# Patient Record
Sex: MALE | Race: WHITE | NOT HISPANIC OR LATINO | URBAN - METROPOLITAN AREA
[De-identification: names, ages, dates, MRNs, and addresses within clinical notes are randomized per-mention and may not be internally consistent; named-entity substitution may affect disease eponyms.]

---

## 2019-03-29 ENCOUNTER — EMERGENCY (EMERGENCY)
Facility: HOSPITAL | Age: 54
LOS: 1 days | Discharge: HOME | End: 2019-03-29
Attending: EMERGENCY MEDICINE | Admitting: EMERGENCY MEDICINE
Payer: COMMERCIAL

## 2019-03-29 VITALS
DIASTOLIC BLOOD PRESSURE: 65 MMHG | HEART RATE: 58 BPM | SYSTOLIC BLOOD PRESSURE: 92 MMHG | RESPIRATION RATE: 15 BRPM | OXYGEN SATURATION: 98 %

## 2019-03-29 VITALS
DIASTOLIC BLOOD PRESSURE: 65 MMHG | HEART RATE: 93 BPM | SYSTOLIC BLOOD PRESSURE: 105 MMHG | TEMPERATURE: 98 F | WEIGHT: 205.03 LBS | RESPIRATION RATE: 18 BRPM | OXYGEN SATURATION: 96 %

## 2019-03-29 PROBLEM — Z00.00 ENCOUNTER FOR PREVENTIVE HEALTH EXAMINATION: Status: ACTIVE | Noted: 2019-03-29

## 2019-03-29 LAB
ALBUMIN SERPL ELPH-MCNC: 4.3 G/DL — SIGNIFICANT CHANGE UP (ref 3.3–5)
ALP SERPL-CCNC: 72 U/L — SIGNIFICANT CHANGE UP (ref 40–120)
ALT FLD-CCNC: 32 U/L — SIGNIFICANT CHANGE UP (ref 12–78)
ANION GAP SERPL CALC-SCNC: 12 MMOL/L — SIGNIFICANT CHANGE UP (ref 5–17)
APTT BLD: 32.8 SEC — SIGNIFICANT CHANGE UP (ref 28.5–37)
AST SERPL-CCNC: 27 U/L — SIGNIFICANT CHANGE UP (ref 15–37)
BILIRUB SERPL-MCNC: 1.5 MG/DL — HIGH (ref 0.2–1.2)
BUN SERPL-MCNC: 23 MG/DL — SIGNIFICANT CHANGE UP (ref 7–23)
CALCIUM SERPL-MCNC: 9.1 MG/DL — SIGNIFICANT CHANGE UP (ref 8.5–10.1)
CHLORIDE SERPL-SCNC: 106 MMOL/L — SIGNIFICANT CHANGE UP (ref 96–108)
CO2 SERPL-SCNC: 24 MMOL/L — SIGNIFICANT CHANGE UP (ref 22–31)
CREAT SERPL-MCNC: 1.3 MG/DL — SIGNIFICANT CHANGE UP (ref 0.5–1.3)
GLUCOSE SERPL-MCNC: 123 MG/DL — HIGH (ref 70–99)
HCT VFR BLD CALC: 50.1 % — HIGH (ref 39–50)
HGB BLD-MCNC: 17.8 G/DL — HIGH (ref 13–17)
INR BLD: 0.97 RATIO — SIGNIFICANT CHANGE UP (ref 0.88–1.16)
MAGNESIUM SERPL-MCNC: 2.1 MG/DL — SIGNIFICANT CHANGE UP (ref 1.6–2.6)
MCHC RBC-ENTMCNC: 31.8 PG — SIGNIFICANT CHANGE UP (ref 27–34)
MCHC RBC-ENTMCNC: 35.5 GM/DL — SIGNIFICANT CHANGE UP (ref 32–36)
MCV RBC AUTO: 89.5 FL — SIGNIFICANT CHANGE UP (ref 80–100)
NRBC # BLD: 0 /100 WBCS — SIGNIFICANT CHANGE UP (ref 0–0)
PLATELET # BLD AUTO: 255 K/UL — SIGNIFICANT CHANGE UP (ref 150–400)
POTASSIUM SERPL-MCNC: 3.4 MMOL/L — LOW (ref 3.5–5.3)
POTASSIUM SERPL-SCNC: 3.4 MMOL/L — LOW (ref 3.5–5.3)
PROT SERPL-MCNC: 7.5 G/DL — SIGNIFICANT CHANGE UP (ref 6–8.3)
PROTHROM AB SERPL-ACNC: 11 SEC — SIGNIFICANT CHANGE UP (ref 10–12.9)
RBC # BLD: 5.6 M/UL — SIGNIFICANT CHANGE UP (ref 4.2–5.8)
RBC # FLD: 12.4 % — SIGNIFICANT CHANGE UP (ref 10.3–14.5)
SODIUM SERPL-SCNC: 142 MMOL/L — SIGNIFICANT CHANGE UP (ref 135–145)
TROPONIN I SERPL-MCNC: <.015 NG/ML — SIGNIFICANT CHANGE UP (ref 0.01–0.04)
WBC # BLD: 7.36 K/UL — SIGNIFICANT CHANGE UP (ref 3.8–10.5)
WBC # FLD AUTO: 7.36 K/UL — SIGNIFICANT CHANGE UP (ref 3.8–10.5)

## 2019-03-29 PROCEDURE — 92960 CARDIOVERSION ELECTRIC EXT: CPT

## 2019-03-29 PROCEDURE — 96374 THER/PROPH/DIAG INJ IV PUSH: CPT | Mod: XU

## 2019-03-29 PROCEDURE — 71045 X-RAY EXAM CHEST 1 VIEW: CPT

## 2019-03-29 PROCEDURE — 84484 ASSAY OF TROPONIN QUANT: CPT

## 2019-03-29 PROCEDURE — 85610 PROTHROMBIN TIME: CPT

## 2019-03-29 PROCEDURE — 71045 X-RAY EXAM CHEST 1 VIEW: CPT | Mod: 26

## 2019-03-29 PROCEDURE — 99285 EMERGENCY DEPT VISIT HI MDM: CPT | Mod: 25

## 2019-03-29 PROCEDURE — 36415 COLL VENOUS BLD VENIPUNCTURE: CPT

## 2019-03-29 PROCEDURE — 83735 ASSAY OF MAGNESIUM: CPT

## 2019-03-29 PROCEDURE — 96375 TX/PRO/DX INJ NEW DRUG ADDON: CPT

## 2019-03-29 PROCEDURE — 80053 COMPREHEN METABOLIC PANEL: CPT

## 2019-03-29 PROCEDURE — 85027 COMPLETE CBC AUTOMATED: CPT

## 2019-03-29 PROCEDURE — 96376 TX/PRO/DX INJ SAME DRUG ADON: CPT

## 2019-03-29 PROCEDURE — 93010 ELECTROCARDIOGRAM REPORT: CPT | Mod: 76

## 2019-03-29 PROCEDURE — 96372 THER/PROPH/DIAG INJ SC/IM: CPT | Mod: XU

## 2019-03-29 PROCEDURE — 93005 ELECTROCARDIOGRAM TRACING: CPT

## 2019-03-29 PROCEDURE — 85730 THROMBOPLASTIN TIME PARTIAL: CPT

## 2019-03-29 PROCEDURE — 99291 CRITICAL CARE FIRST HOUR: CPT

## 2019-03-29 RX ORDER — METOPROLOL TARTRATE 50 MG
25 TABLET ORAL DAILY
Qty: 0 | Refills: 0 | Status: DISCONTINUED | OUTPATIENT
Start: 2019-03-29 | End: 2019-04-02

## 2019-03-29 RX ORDER — ENOXAPARIN SODIUM 100 MG/ML
90 INJECTION SUBCUTANEOUS ONCE
Qty: 0 | Refills: 0 | Status: COMPLETED | OUTPATIENT
Start: 2019-03-29 | End: 2019-03-29

## 2019-03-29 RX ORDER — SODIUM CHLORIDE 9 MG/ML
1000 INJECTION INTRAMUSCULAR; INTRAVENOUS; SUBCUTANEOUS
Qty: 0 | Refills: 0 | Status: DISCONTINUED | OUTPATIENT
Start: 2019-03-29 | End: 2019-04-02

## 2019-03-29 RX ORDER — METOPROLOL TARTRATE 50 MG
1 TABLET ORAL
Qty: 30 | Refills: 0 | OUTPATIENT
Start: 2019-03-29 | End: 2019-04-27

## 2019-03-29 RX ORDER — APIXABAN 2.5 MG/1
1 TABLET, FILM COATED ORAL
Qty: 60 | Refills: 0
Start: 2019-03-29 | End: 2019-04-27

## 2019-03-29 RX ORDER — MORPHINE SULFATE 50 MG/1
4 CAPSULE, EXTENDED RELEASE ORAL ONCE
Qty: 0 | Refills: 0 | Status: DISCONTINUED | OUTPATIENT
Start: 2019-03-29 | End: 2019-03-29

## 2019-03-29 RX ORDER — POTASSIUM CHLORIDE 20 MEQ
40 PACKET (EA) ORAL EVERY 4 HOURS
Qty: 0 | Refills: 0 | Status: COMPLETED | OUTPATIENT
Start: 2019-03-29 | End: 2019-03-29

## 2019-03-29 RX ORDER — IBUTILIDE FUMARATE 0.1 MG/ML
1 INJECTION, SOLUTION INTRAVENOUS ONCE
Qty: 0 | Refills: 0 | Status: COMPLETED | OUTPATIENT
Start: 2019-03-29 | End: 2019-03-29

## 2019-03-29 RX ORDER — MORPHINE SULFATE 50 MG/1
2 CAPSULE, EXTENDED RELEASE ORAL ONCE
Qty: 0 | Refills: 0 | Status: DISCONTINUED | OUTPATIENT
Start: 2019-03-29 | End: 2019-03-29

## 2019-03-29 RX ORDER — ONDANSETRON 8 MG/1
4 TABLET, FILM COATED ORAL ONCE
Qty: 0 | Refills: 0 | Status: COMPLETED | OUTPATIENT
Start: 2019-03-29 | End: 2019-03-29

## 2019-03-29 RX ORDER — METOPROLOL TARTRATE 50 MG
1 TABLET ORAL
Qty: 30 | Refills: 0
Start: 2019-03-29 | End: 2019-04-27

## 2019-03-29 RX ADMIN — IBUTILIDE FUMARATE 360 MILLIGRAM(S): 0.1 INJECTION, SOLUTION INTRAVENOUS at 08:58

## 2019-03-29 RX ADMIN — SODIUM CHLORIDE 125 MILLILITER(S): 9 INJECTION INTRAMUSCULAR; INTRAVENOUS; SUBCUTANEOUS at 08:40

## 2019-03-29 RX ADMIN — Medication 40 MILLIEQUIVALENT(S): at 08:39

## 2019-03-29 RX ADMIN — IBUTILIDE FUMARATE 360 MILLIGRAM(S): 0.1 INJECTION, SOLUTION INTRAVENOUS at 08:06

## 2019-03-29 RX ADMIN — MORPHINE SULFATE 2 MILLIGRAM(S): 50 CAPSULE, EXTENDED RELEASE ORAL at 11:50

## 2019-03-29 RX ADMIN — Medication 40 MILLIEQUIVALENT(S): at 09:30

## 2019-03-29 RX ADMIN — ENOXAPARIN SODIUM 90 MILLIGRAM(S): 100 INJECTION SUBCUTANEOUS at 11:01

## 2019-03-29 RX ADMIN — ONDANSETRON 4 MILLIGRAM(S): 8 TABLET, FILM COATED ORAL at 11:01

## 2019-03-29 NOTE — CONSULT NOTE ADULT - ASSESSMENT
yo F/M with PMH of    Doubt ACS, pain likely pleuritic vs musculoskeletal.    - Reccomened Ibutilide 1mg over 10 min x2 if needed  - Last ate/ drank 5:30am. May need   - His CKs are flat, which suggests against acute atherosclerotic plaque rupture  - No evidence of volume overload         - Monitor and replete lytes, keep K>4, Mg>2    - Other cardiovascular workup will depend on clinical course.  - All other workup per primary team  - Will follow yo F/M with PMH of    Doubt ACS, pain likely pleuritic vs musculoskeletal.    - Reccomened Ibutilide 1mg over 10 min x2   - Last ate/ drank 6:00am. May need cardioversion  - Potassium supplemented 40meq x2  - Need outpatient w/u with stress test and echo  - Likely discharge on NOAC  - Will follow up with Dr. Delgado/ Zay on discharge  - Monitor and replete lytes, keep K>4, Mg>2  - Will follow 53 year old man with PAF, now AF with RVR    Doubt ACS, pain likely pleuritic vs musculoskeletal.    - Reccomened Ibutilide 1mg over 10 min x2   - Last ate/ drank 6:00am. May need cardioversion.  WIll arrange with anesthesia if necessary  - Potassium supplemented 40meq x2  - Need outpatient w/u with stress test and echo  - Likely discharge on NOAC  - Will follow up with Dr. Delgado/ Zay on discharge  - Monitor and replete lytes, keep K>4, Mg>2  - Will follow

## 2019-03-29 NOTE — ED PROVIDER NOTE - OBJECTIVE STATEMENT
"flutter" last night, slept and woke up c flutter , pt drank a cup of coffee c mild around 0530 today.   Last episode 2009, hospitalized in [facility] and was cardioverted.   subjective sob and almost passed out this am.

## 2019-03-29 NOTE — PHARMACOTHERAPY INTERVENTION NOTE - COMMENTS
Patient in a. fib (RVR) ordered ibutilide in ER - discussed with ER MD and cardio, EKG shows QTc 490, K 3.4 prior to infusion. Patient received 1 dose of ibutilide, recommended to Dr Lewis for repeat EKG post infusion. Dr Trevizo cardio approve for second dose of ibutilide (repeat EKG and QTc reviewed); per cardio pt may convert back to NSR so would like to proceed with another dose - ok

## 2019-03-29 NOTE — ED PROVIDER NOTE - CARE PROVIDER_API CALL
Han Trevizo)  Internal Medicine  [address] [place] [address]  Phone: [phone]  Fax: [phone]  Follow Up Time:

## 2019-03-29 NOTE — ED ADULT NURSE NOTE - OBJECTIVE STATEMENT
patient comes to ED for evaluation of heart palpitations pt has hx of a fib 10 years ago on no medication patient states he was sitting at his desk and began feeling short of breath and having palpitations denies chest pain

## 2019-03-29 NOTE — CONSULT NOTE ADULT - SUBJECTIVE AND OBJECTIVE BOX
Patient is a 53y old  Male who presents with a chief complaint of chest flutter    HPI: 54 yo M with PMH of Afib p/w chest flutter      PAST MEDICAL & SURGICAL HISTORY:  Afib  No significant past surgical history      Home Medications:  atorvastatin:  (29 Mar 2019 07:03)    MEDICATIONS  (STANDING):  ibutilide IVPB 1 milliGRAM(s) IV Intermittent Once  sodium chloride 0.9%. 1000 milliLiter(s) (125 mL/Hr) IV Continuous <Continuous>    FAMILY HISTORY: No family history of MI or CAD          REVIEW OF SYSTEMS    Constitutional: denies fever, chills, diaphoresis   HEENT: denies blurry vision, difficulty hearing  Respiratory: denies SOB, BAHENA, cough, sputum production, wheezing  Cardiovascular: Admits chest flutter, no LE edema  Gastrointestinal: denies nausea, vomiting, diarrhea, constipation, abdominal pain, melena, hematochezia   Genitourinary: denies dysuria, frequency, urgency, hematuria   Skin: Denies rashes, itching  Musculoskeletal: denies myalgias, joint swelling, muscle weakness  Neurologic: denies headache, weakness, dizziness, numbness/tingling  Hematology/Oncology: denies bleeding, easy bruising  ROS negative except as noted above    Allergic/Immunologic: Allergies    PC Pen VK (Unknown)        SOCIAL HISTORY: No tobacco, alcohol or drug use    Vital Signs Last 24 Hrs  T(C): 36.5 (29 Mar 2019 07:03), Max: 36.5 (29 Mar 2019 07:03)  T(F): 97.7 (29 Mar 2019 07:03), Max: 97.7 (29 Mar 2019 07:03)  HR: 128 (29 Mar 2019 07:41) (93 - 128)  BP: 126/68 (29 Mar 2019 07:41) (105/65 - 126/68)  BP(mean): --  RR: 16 (29 Mar 2019 07:41) (16 - 18)  SpO2: 100% (29 Mar 2019 07:41) (96% - 100%)    PHYSICAL EXAM:    Physical Exam:  General: Well developed, well nourished, NAD  HEENT: NCAT, PERRLA, EOMI bl, moist mucous membranes   Neck: Supple, nontender, no mass  Neurology: A&Ox3, nonfocal, sensation intact   Respiratory: CTA B/L, No W/R/R  CV: Tachycardic, irregular, no murmurs, rubs or gallops  Abdominal: Soft, NT, ND +BSx4, no palpable masses  Extremities: No C/C/E, + peripheral pulses  MSK: Normal ROM, no joint erythema or warmth, no joint swelling   Heme: No palpable supraclavicular nodules, no obvious ecchymosis or petechiae   Skin: warm, dry, normal color          ECG:    I&O's Detail      LABS:                        17.8   7.36  )-----------( 255      ( 29 Mar 2019 07:33 )             50.1     03-29    142  |  106  |  23  ----------------------------<  123<H>  3.4<L>   |  24  |  1.30    Ca    9.1      29 Mar 2019 07:33    TPro  7.5  /  Alb  4.3  /  TBili  1.5<H>  /  DBili  x   /  AST  27  /  ALT  32  /  AlkPhos  72  03-29    CARDIAC MARKERS ( 29 Mar 2019 07:33 )  <.015 ng/mL / x     / x     / x     / x          PT/INR - ( 29 Mar 2019 07:37 )   PT: 11.0 sec;   INR: 0.97 ratio         PTT - ( 29 Mar 2019 07:37 )  PTT:32.8 sec Patient is a 53y old  Male who presents with a chief complaint of chest flutter    HPI: 54 yo M with PMH of Afib p/w chest flutter since last night. No precipitating events or trauma. Went to sleep without any issues and woke up with same symptoms, pt drank a cup of coffee which worsened the symptoms at 5:30am. After drinking coffee, felt feint, shaky and short of breath. Has episode of same, last episode , hospitalized in [facility] and was cardioverted. Follows cardiology Dr. Delgado and Dr. Jovel who he last saw in . Had an echo and stress test which was negative back in . Denies dyspnea, PND, orthopnea, near syncope, syncope, or lower extremity edema.       PAST MEDICAL & SURGICAL HISTORY:  Afib  No significant past surgical history      Home Medications:  atorvastatin:  (29 Mar 2019 07:03)    MEDICATIONS  (STANDING):  ibutilide IVPB 1 milliGRAM(s) IV Intermittent Once  sodium chloride 0.9%. 1000 milliLiter(s) (125 mL/Hr) IV Continuous <Continuous>    FAMILY HISTORY: Father rheumatic fever,  age 43. Mother age 89 valve replacement           REVIEW OF SYSTEMS    Constitutional: denies fever, chills, diaphoresis   HEENT: denies blurry vision, difficulty hearing  Respiratory: denies BAHENA, cough, sputum production, wheezing  Cardiovascular: Admits chest flutter, no LE edema  Gastrointestinal: denies nausea, vomiting, diarrhea, constipation, abdominal pain, melena, hematochezia   Genitourinary: denies dysuria, frequency, urgency, hematuria   Skin: Denies rashes, itching  Musculoskeletal: denies myalgias, joint swelling, muscle weakness  Neurologic: denies headache, weakness, dizziness, numbness/tingling  Hematology/Oncology: denies bleeding, easy bruising  ROS negative except as noted above    Allergic/Immunologic: Allergies    PC Pen VK (Unknown)  Doesnt tolerate ASA or Advil        SOCIAL HISTORY: No tobacco, alcohol or drug use    Vital Signs Last 24 Hrs  T(C): 36.5 (29 Mar 2019 07:03), Max: 36.5 (29 Mar 2019 07:03)  T(F): 97.7 (29 Mar 2019 07:03), Max: 97.7 (29 Mar 2019 07:03)  HR: 128 (29 Mar 2019 07:41) (93 - 128)  BP: 126/68 (29 Mar 2019 07:41) (105/65 - 126/68)  BP(mean): --  RR: 16 (29 Mar 2019 07:41) (16 - 18)  SpO2: 100% (29 Mar 2019 07:41) (96% - 100%)    PHYSICAL EXAM:    Physical Exam:  General: Well developed, well nourished, NAD  HEENT: NCAT, PERRLA, EOMI bl, moist mucous membranes   Neck: Supple, nontender, no mass  Neurology: A&Ox3, nonfocal, sensation intact   Respiratory: CTA B/L, No W/R/R  CV: Tachycardic, irregular, no murmurs, rubs or gallops  Abdominal: Soft, NT, ND +BSx4, no palpable masses  Extremities: No C/C/E, + peripheral pulses  MSK: Normal ROM, no joint erythema or warmth, no joint swelling   Heme: No palpable supraclavicular nodules, no obvious ecchymosis or petechiae   Skin: warm, dry, normal color          ECG: Afib with RVR rate 154bpm    I&O's Detail      LABS:                        17.8   7.36  )-----------( 255      ( 29 Mar 2019 07:33 )             50.1     03-    142  |  106  |  23  ----------------------------<  123<H>  3.4<L>   |  24  |  1.30    Ca    9.1      29 Mar 2019 07:33    TPro  7.5  /  Alb  4.3  /  TBili  1.5<H>  /  DBili  x   /  AST  27  /  ALT  32  /  AlkPhos  72  03-29    CARDIAC MARKERS ( 29 Mar 2019 07:33 )  <.015 ng/mL / x     / x     / x     / x          PT/INR - ( 29 Mar 2019 07:37 )   PT: 11.0 sec;   INR: 0.97 ratio         PTT - ( 29 Mar 2019 07:37 )  PTT:32.8 sec

## 2019-03-29 NOTE — ED PROVIDER NOTE - PROGRESS NOTE DETAILS
All results were explained to patient and/or family and a copy of all available results given.  pt cleared by Santhosh orozco cardiocherelle. Repeat ekg 60 bpm

## 2019-03-29 NOTE — ED ADULT TRIAGE NOTE - CHIEF COMPLAINT QUOTE
Pt reports that he feels like his heart is racing, and is short of breath. Denies pain, denies any other symptoms.

## 2019-04-02 ENCOUNTER — NON-APPOINTMENT (OUTPATIENT)
Age: 54
End: 2019-04-02

## 2019-04-02 ENCOUNTER — APPOINTMENT (OUTPATIENT)
Dept: CARDIOLOGY | Facility: CLINIC | Age: 54
End: 2019-04-02
Payer: COMMERCIAL

## 2019-04-02 VITALS
HEIGHT: 77 IN | OXYGEN SATURATION: 100 % | DIASTOLIC BLOOD PRESSURE: 69 MMHG | SYSTOLIC BLOOD PRESSURE: 129 MMHG | WEIGHT: 204 LBS | HEART RATE: 54 BPM | BODY MASS INDEX: 24.09 KG/M2

## 2019-04-02 DIAGNOSIS — Z82.49 FAMILY HISTORY OF ISCHEMIC HEART DISEASE AND OTHER DISEASES OF THE CIRCULATORY SYSTEM: ICD-10-CM

## 2019-04-02 DIAGNOSIS — Z86.012 PERSONAL HISTORY OF BENIGN CARCINOID TUMOR: ICD-10-CM

## 2019-04-02 PROBLEM — I48.91 UNSPECIFIED ATRIAL FIBRILLATION: Chronic | Status: ACTIVE | Noted: 2019-03-29

## 2019-04-02 PROCEDURE — 99215 OFFICE O/P EST HI 40 MIN: CPT

## 2019-04-02 PROCEDURE — 93000 ELECTROCARDIOGRAM COMPLETE: CPT

## 2019-04-02 NOTE — DISCUSSION/SUMMARY
[FreeTextEntry1] : This is a 53 year old man with a history of PAF who presents to the office for follow up.  This past weekend he went into atrial fibrillation.  He presented to [facility], and got ibutilide x 2, which did not convert him.  He ended up undergoing DCCV.  He was started on Eliquis and metoprolol.  His last episode was 10 years ago.  His CHADSVASC score is 0.  He will continue Eliquis and metoprolol for one month, and then stop.  He is going to undergo echocardiography to assess for any structural heart disease.  WE discussed that he should be on aspirin, but he can not take it secondary to past GI bleeding issues.  He is going to stay off of all antiplats moving forward.  His thromboembolic risk is low.  WE discussed the natural history of AF, and he will call me when if it recurs.  He will follow yearly.

## 2019-04-02 NOTE — HISTORY OF PRESENT ILLNESS
[FreeTextEntry1] : This is a 53 year old man with a history of PAF who presents to the office for follow up.  This past weekend he went into atrial fibrillation.  He presented to [facility], and got ibutilide x 2, which did not convert him.  He ended up undergoing DCCV.  He was started on Eliquis and metoprolol.  His last episode was 10 years ago.  he has not had any recurrences since discharge.  No bleeding.  He denies chest pain, dyspnea, PND, orthopnea, LE swelling, dizziness, or syncope.  He does have a bit of increased fatigue.

## 2019-04-02 NOTE — PHYSICAL EXAM
[Normal Appearance] : normal appearance [General Appearance - In No Acute Distress] : no acute distress [Normal Conjunctiva] : the conjunctiva exhibited no abnormalities [Normal Oral Mucosa] : normal oral mucosa [Normal Jugular Venous V Waves Present] : normal jugular venous V waves present [FreeTextEntry1] : No JVD [Respiration, Rhythm And Depth] : normal respiratory rhythm and effort [Exaggerated Use Of Accessory Muscles For Inspiration] : no accessory muscle use [Auscultation Breath Sounds / Voice Sounds] : lungs were clear to auscultation bilaterally [Not Palpable] : not palpable [No Precordial Heave] : no precordial heave was noted [Bradycardia] : bradycardic [Rhythm Regular] : regular [Normal S1] : normal S1 [Normal S2] : normal S2 [No Gallop] : no gallop heard [No Murmur] : no murmurs heard [Right Carotid Bruit] : no bruit heard over the right carotid [Left Carotid Bruit] : no bruit heard over the left carotid [1+] : left 1+ [No Pitting Edema] : no pitting edema present [Bowel Sounds] : normal bowel sounds [Abdomen Soft] : soft [Abdomen Tenderness] : non-tender [Abnormal Walk] : normal gait [Gait - Sufficient For Exercise Testing] : the gait was sufficient for exercise testing [Nail Clubbing] : no clubbing of the fingernails [Cyanosis, Localized] : no localized cyanosis [Petechial Hemorrhages (___cm)] : no petechial hemorrhages [Skin Color & Pigmentation] : normal skin color and pigmentation [] : no rash [No Venous Stasis] : no venous stasis [Skin Lesions] : no skin lesions [Oriented To Time, Place, And Person] : oriented to person, place, and time [Affect] : the affect was normal [Mood] : the mood was normal [No Anxiety] : not feeling anxious

## 2019-04-02 NOTE — REASON FOR VISIT
[Follow-Up - From Hospitalization] : follow-up of a recent hospitalization for [Atrial Fibrillation] : atrial fibrillation [Spouse] : spouse

## 2019-04-17 ENCOUNTER — APPOINTMENT (OUTPATIENT)
Dept: CARDIOLOGY | Facility: CLINIC | Age: 54
End: 2019-04-17
Payer: COMMERCIAL

## 2019-04-17 PROCEDURE — 93306 TTE W/DOPPLER COMPLETE: CPT

## 2020-03-16 ENCOUNTER — NON-APPOINTMENT (OUTPATIENT)
Age: 55
End: 2020-03-16

## 2020-03-16 ENCOUNTER — APPOINTMENT (OUTPATIENT)
Dept: CARDIOLOGY | Facility: CLINIC | Age: 55
End: 2020-03-16
Payer: COMMERCIAL

## 2020-03-16 VITALS
HEART RATE: 47 BPM | OXYGEN SATURATION: 100 % | HEIGHT: 77 IN | WEIGHT: 202 LBS | BODY MASS INDEX: 23.85 KG/M2 | DIASTOLIC BLOOD PRESSURE: 71 MMHG | SYSTOLIC BLOOD PRESSURE: 110 MMHG

## 2020-03-16 PROCEDURE — 93000 ELECTROCARDIOGRAM COMPLETE: CPT

## 2020-03-16 PROCEDURE — 99214 OFFICE O/P EST MOD 30 MIN: CPT

## 2020-03-16 RX ORDER — METOPROLOL SUCCINATE 25 MG/1
25 TABLET, EXTENDED RELEASE ORAL
Qty: 30 | Refills: 0 | Status: DISCONTINUED | COMMUNITY
Start: 2019-03-29 | End: 2020-03-16

## 2020-03-16 RX ORDER — APIXABAN 5 MG/1
5 TABLET, FILM COATED ORAL
Qty: 60 | Refills: 0 | Status: DISCONTINUED | COMMUNITY
Start: 2019-03-29 | End: 2020-03-16

## 2020-05-15 ENCOUNTER — INPATIENT (INPATIENT)
Facility: HOSPITAL | Age: 55
LOS: 0 days | Discharge: HOME | DRG: 389 | End: 2020-05-16
Attending: FAMILY MEDICINE | Admitting: INTERNAL MEDICINE
Payer: COMMERCIAL

## 2020-05-15 VITALS
HEIGHT: 77 IN | OXYGEN SATURATION: 100 % | HEART RATE: 54 BPM | TEMPERATURE: 98 F | DIASTOLIC BLOOD PRESSURE: 74 MMHG | WEIGHT: 205.03 LBS | SYSTOLIC BLOOD PRESSURE: 132 MMHG | RESPIRATION RATE: 16 BRPM

## 2020-05-15 DIAGNOSIS — Z29.9 ENCOUNTER FOR PROPHYLACTIC MEASURES, UNSPECIFIED: ICD-10-CM

## 2020-05-15 DIAGNOSIS — K76.9 LIVER DISEASE, UNSPECIFIED: ICD-10-CM

## 2020-05-15 DIAGNOSIS — M79.89 OTHER SPECIFIED SOFT TISSUE DISORDERS: ICD-10-CM

## 2020-05-15 DIAGNOSIS — E78.5 HYPERLIPIDEMIA, UNSPECIFIED: ICD-10-CM

## 2020-05-15 DIAGNOSIS — D3A.00 BENIGN CARCINOID TUMOR OF UNSPECIFIED SITE: ICD-10-CM

## 2020-05-15 DIAGNOSIS — K56.1 INTUSSUSCEPTION: ICD-10-CM

## 2020-05-15 DIAGNOSIS — D3A.00 BENIGN CARCINOID TUMOR OF UNSPECIFIED SITE: Chronic | ICD-10-CM

## 2020-05-15 DIAGNOSIS — R00.1 BRADYCARDIA, UNSPECIFIED: ICD-10-CM

## 2020-05-15 DIAGNOSIS — R10.9 UNSPECIFIED ABDOMINAL PAIN: ICD-10-CM

## 2020-05-15 DIAGNOSIS — I48.91 UNSPECIFIED ATRIAL FIBRILLATION: ICD-10-CM

## 2020-05-15 LAB
ALBUMIN SERPL ELPH-MCNC: 3.8 G/DL — SIGNIFICANT CHANGE UP (ref 3.3–5)
ALP SERPL-CCNC: 50 U/L — SIGNIFICANT CHANGE UP (ref 40–120)
ALT FLD-CCNC: 29 U/L — SIGNIFICANT CHANGE UP (ref 12–78)
AMYLASE P1 CFR SERPL: 51 U/L — SIGNIFICANT CHANGE UP (ref 25–125)
ANION GAP SERPL CALC-SCNC: 7 MMOL/L — SIGNIFICANT CHANGE UP (ref 5–17)
APPEARANCE UR: CLEAR — SIGNIFICANT CHANGE UP
APTT BLD: 31.9 SEC — SIGNIFICANT CHANGE UP (ref 28.5–37)
AST SERPL-CCNC: 22 U/L — SIGNIFICANT CHANGE UP (ref 15–37)
BILIRUB SERPL-MCNC: 1.8 MG/DL — HIGH (ref 0.2–1.2)
BILIRUB UR-MCNC: NEGATIVE — SIGNIFICANT CHANGE UP
BLD GP AB SCN SERPL QL: SIGNIFICANT CHANGE UP
BUN SERPL-MCNC: 16 MG/DL — SIGNIFICANT CHANGE UP (ref 7–23)
CALCIUM SERPL-MCNC: 8.9 MG/DL — SIGNIFICANT CHANGE UP (ref 8.5–10.1)
CHLORIDE SERPL-SCNC: 104 MMOL/L — SIGNIFICANT CHANGE UP (ref 96–108)
CO2 SERPL-SCNC: 30 MMOL/L — SIGNIFICANT CHANGE UP (ref 22–31)
COLOR SPEC: YELLOW — SIGNIFICANT CHANGE UP
CREAT SERPL-MCNC: 1.1 MG/DL — SIGNIFICANT CHANGE UP (ref 0.5–1.3)
DIFF PNL FLD: NEGATIVE — SIGNIFICANT CHANGE UP
GLUCOSE SERPL-MCNC: 104 MG/DL — HIGH (ref 70–99)
GLUCOSE UR QL: NEGATIVE — SIGNIFICANT CHANGE UP
HCT VFR BLD CALC: 41.9 % — SIGNIFICANT CHANGE UP (ref 39–50)
HGB BLD-MCNC: 14.6 G/DL — SIGNIFICANT CHANGE UP (ref 13–17)
INR BLD: 1.08 RATIO — SIGNIFICANT CHANGE UP (ref 0.88–1.16)
KETONES UR-MCNC: NEGATIVE — SIGNIFICANT CHANGE UP
LACTATE SERPL-SCNC: 1.1 MMOL/L — SIGNIFICANT CHANGE UP (ref 0.7–2)
LEUKOCYTE ESTERASE UR-ACNC: NEGATIVE — SIGNIFICANT CHANGE UP
LIDOCAIN IGE QN: 138 U/L — SIGNIFICANT CHANGE UP (ref 73–393)
MCHC RBC-ENTMCNC: 31.4 PG — SIGNIFICANT CHANGE UP (ref 27–34)
MCHC RBC-ENTMCNC: 34.8 GM/DL — SIGNIFICANT CHANGE UP (ref 32–36)
MCV RBC AUTO: 90.1 FL — SIGNIFICANT CHANGE UP (ref 80–100)
NITRITE UR-MCNC: NEGATIVE — SIGNIFICANT CHANGE UP
NRBC # BLD: 0 /100 WBCS — SIGNIFICANT CHANGE UP (ref 0–0)
PH UR: 6.5 — SIGNIFICANT CHANGE UP (ref 5–8)
PLATELET # BLD AUTO: 188 K/UL — SIGNIFICANT CHANGE UP (ref 150–400)
POTASSIUM SERPL-MCNC: 4.1 MMOL/L — SIGNIFICANT CHANGE UP (ref 3.5–5.3)
POTASSIUM SERPL-SCNC: 4.1 MMOL/L — SIGNIFICANT CHANGE UP (ref 3.5–5.3)
PROT SERPL-MCNC: 6.9 G/DL — SIGNIFICANT CHANGE UP (ref 6–8.3)
PROT UR-MCNC: NEGATIVE — SIGNIFICANT CHANGE UP
PROTHROM AB SERPL-ACNC: 12.1 SEC — SIGNIFICANT CHANGE UP (ref 10–12.9)
RBC # BLD: 4.65 M/UL — SIGNIFICANT CHANGE UP (ref 4.2–5.8)
RBC # FLD: 12.5 % — SIGNIFICANT CHANGE UP (ref 10.3–14.5)
SARS-COV-2 RNA SPEC QL NAA+PROBE: SIGNIFICANT CHANGE UP
SODIUM SERPL-SCNC: 141 MMOL/L — SIGNIFICANT CHANGE UP (ref 135–145)
SP GR SPEC: 1.01 — SIGNIFICANT CHANGE UP (ref 1.01–1.02)
UROBILINOGEN FLD QL: NEGATIVE — SIGNIFICANT CHANGE UP
WBC # BLD: 7.28 K/UL — SIGNIFICANT CHANGE UP (ref 3.8–10.5)
WBC # FLD AUTO: 7.28 K/UL — SIGNIFICANT CHANGE UP (ref 3.8–10.5)

## 2020-05-15 PROCEDURE — 99285 EMERGENCY DEPT VISIT HI MDM: CPT

## 2020-05-15 PROCEDURE — 76705 ECHO EXAM OF ABDOMEN: CPT | Mod: 26

## 2020-05-15 PROCEDURE — 99222 1ST HOSP IP/OBS MODERATE 55: CPT

## 2020-05-15 PROCEDURE — 74183 MRI ABD W/O CNTR FLWD CNTR: CPT | Mod: 26

## 2020-05-15 PROCEDURE — 99223 1ST HOSP IP/OBS HIGH 75: CPT | Mod: GC

## 2020-05-15 PROCEDURE — 74177 CT ABD & PELVIS W/CONTRAST: CPT | Mod: 26

## 2020-05-15 PROCEDURE — 93010 ELECTROCARDIOGRAM REPORT: CPT

## 2020-05-15 RX ORDER — ATORVASTATIN CALCIUM 80 MG/1
10 TABLET, FILM COATED ORAL AT BEDTIME
Refills: 0 | Status: DISCONTINUED | OUTPATIENT
Start: 2020-05-15 | End: 2020-05-16

## 2020-05-15 RX ORDER — ATORVASTATIN CALCIUM 80 MG/1
1 TABLET, FILM COATED ORAL
Qty: 0 | Refills: 0 | DISCHARGE

## 2020-05-15 RX ORDER — HYDROMORPHONE HYDROCHLORIDE 2 MG/ML
0.5 INJECTION INTRAMUSCULAR; INTRAVENOUS; SUBCUTANEOUS EVERY 6 HOURS
Refills: 0 | Status: DISCONTINUED | OUTPATIENT
Start: 2020-05-15 | End: 2020-05-16

## 2020-05-15 RX ORDER — ONDANSETRON 8 MG/1
4 TABLET, FILM COATED ORAL ONCE
Refills: 0 | Status: COMPLETED | OUTPATIENT
Start: 2020-05-15 | End: 2020-05-15

## 2020-05-15 RX ORDER — HYDROMORPHONE HYDROCHLORIDE 2 MG/ML
0.5 INJECTION INTRAMUSCULAR; INTRAVENOUS; SUBCUTANEOUS ONCE
Refills: 0 | Status: DISCONTINUED | OUTPATIENT
Start: 2020-05-15 | End: 2020-05-15

## 2020-05-15 RX ORDER — HYDROMORPHONE HYDROCHLORIDE 2 MG/ML
0.5 INJECTION INTRAMUSCULAR; INTRAVENOUS; SUBCUTANEOUS EVERY 6 HOURS
Refills: 0 | Status: DISCONTINUED | OUTPATIENT
Start: 2020-05-15 | End: 2020-05-15

## 2020-05-15 RX ORDER — FERROUS SULFATE 325(65) MG
1 TABLET ORAL
Qty: 0 | Refills: 0 | DISCHARGE

## 2020-05-15 RX ORDER — PANTOPRAZOLE SODIUM 20 MG/1
40 TABLET, DELAYED RELEASE ORAL ONCE
Refills: 0 | Status: COMPLETED | OUTPATIENT
Start: 2020-05-15 | End: 2020-05-15

## 2020-05-15 RX ORDER — SODIUM CHLORIDE 9 MG/ML
1000 INJECTION INTRAMUSCULAR; INTRAVENOUS; SUBCUTANEOUS
Refills: 0 | Status: DISCONTINUED | OUTPATIENT
Start: 2020-05-15 | End: 2020-05-16

## 2020-05-15 RX ORDER — ACETAMINOPHEN 500 MG
650 TABLET ORAL EVERY 4 HOURS
Refills: 0 | Status: DISCONTINUED | OUTPATIENT
Start: 2020-05-15 | End: 2020-05-16

## 2020-05-15 RX ORDER — HYDROMORPHONE HYDROCHLORIDE 2 MG/ML
0.55 INJECTION INTRAMUSCULAR; INTRAVENOUS; SUBCUTANEOUS ONCE
Refills: 0 | Status: DISCONTINUED | OUTPATIENT
Start: 2020-05-15 | End: 2020-05-15

## 2020-05-15 RX ORDER — ATORVASTATIN CALCIUM 80 MG/1
0 TABLET, FILM COATED ORAL
Qty: 0 | Refills: 0 | DISCHARGE

## 2020-05-15 RX ORDER — ONDANSETRON 8 MG/1
4 TABLET, FILM COATED ORAL EVERY 6 HOURS
Refills: 0 | Status: DISCONTINUED | OUTPATIENT
Start: 2020-05-15 | End: 2020-05-16

## 2020-05-15 RX ORDER — ATORVASTATIN CALCIUM 80 MG/1
10 TABLET, FILM COATED ORAL DAILY
Refills: 0 | Status: DISCONTINUED | OUTPATIENT
Start: 2020-05-15 | End: 2020-05-15

## 2020-05-15 RX ORDER — TRAMADOL HYDROCHLORIDE 50 MG/1
50 TABLET ORAL EVERY 6 HOURS
Refills: 0 | Status: DISCONTINUED | OUTPATIENT
Start: 2020-05-15 | End: 2020-05-16

## 2020-05-15 RX ORDER — FERROUS SULFATE 325(65) MG
325 TABLET ORAL DAILY
Refills: 0 | Status: DISCONTINUED | OUTPATIENT
Start: 2020-05-15 | End: 2020-05-16

## 2020-05-15 RX ORDER — SODIUM CHLORIDE 9 MG/ML
1000 INJECTION INTRAMUSCULAR; INTRAVENOUS; SUBCUTANEOUS ONCE
Refills: 0 | Status: COMPLETED | OUTPATIENT
Start: 2020-05-15 | End: 2020-05-15

## 2020-05-15 RX ADMIN — Medication 650 MILLIGRAM(S): at 19:14

## 2020-05-15 RX ADMIN — SODIUM CHLORIDE 1000 MILLILITER(S): 9 INJECTION INTRAMUSCULAR; INTRAVENOUS; SUBCUTANEOUS at 04:57

## 2020-05-15 RX ADMIN — PANTOPRAZOLE SODIUM 40 MILLIGRAM(S): 20 TABLET, DELAYED RELEASE ORAL at 02:18

## 2020-05-15 RX ADMIN — SODIUM CHLORIDE 130 MILLILITER(S): 9 INJECTION INTRAMUSCULAR; INTRAVENOUS; SUBCUTANEOUS at 12:36

## 2020-05-15 RX ADMIN — SODIUM CHLORIDE 1000 MILLILITER(S): 9 INJECTION INTRAMUSCULAR; INTRAVENOUS; SUBCUTANEOUS at 01:08

## 2020-05-15 RX ADMIN — ONDANSETRON 4 MILLIGRAM(S): 8 TABLET, FILM COATED ORAL at 20:38

## 2020-05-15 RX ADMIN — SODIUM CHLORIDE 130 MILLILITER(S): 9 INJECTION INTRAMUSCULAR; INTRAVENOUS; SUBCUTANEOUS at 20:34

## 2020-05-15 RX ADMIN — ATORVASTATIN CALCIUM 10 MILLIGRAM(S): 80 TABLET, FILM COATED ORAL at 20:34

## 2020-05-15 RX ADMIN — Medication 325 MILLIGRAM(S): at 13:06

## 2020-05-15 RX ADMIN — TRAMADOL HYDROCHLORIDE 50 MILLIGRAM(S): 50 TABLET ORAL at 20:36

## 2020-05-15 RX ADMIN — HYDROMORPHONE HYDROCHLORIDE 0.5 MILLIGRAM(S): 2 INJECTION INTRAMUSCULAR; INTRAVENOUS; SUBCUTANEOUS at 05:38

## 2020-05-15 RX ADMIN — ONDANSETRON 4 MILLIGRAM(S): 8 TABLET, FILM COATED ORAL at 01:08

## 2020-05-15 RX ADMIN — ONDANSETRON 4 MILLIGRAM(S): 8 TABLET, FILM COATED ORAL at 05:38

## 2020-05-15 RX ADMIN — HYDROMORPHONE HYDROCHLORIDE 0.55 MILLIGRAM(S): 2 INJECTION INTRAMUSCULAR; INTRAVENOUS; SUBCUTANEOUS at 09:49

## 2020-05-15 RX ADMIN — HYDROMORPHONE HYDROCHLORIDE 0.5 MILLIGRAM(S): 2 INJECTION INTRAMUSCULAR; INTRAVENOUS; SUBCUTANEOUS at 01:08

## 2020-05-15 NOTE — H&P ADULT - PROBLEM SELECTOR PLAN 6
IMPROVE VTE Individual Risk Assessment          RISK                                                          Points  [  ] Previous VTE                                                3  [  ] Thrombophilia                                             2  [  ] Lower limb paralysis                                   2        (unable to hold up >15 seconds)    [  ] Current Cancer                                             2         (within 6 months)  [  ] Immobilization > 24 hrs                              1  [  ] ICU/CCU stay > 24 hours                             1  [  ] Age > 60                                                         1    IMPROVE VTE Score: 0     DVT PPX: Will hold AC for poss OR. SCDs in place - Incidental 9 mm hypodense lesion within the right hepatic lobe too small to characterize  - F/u outpatient - Incidental 9 mm hypodense lesion within the right hepatic lobe too small to characterize  - F/u MR abdomen. - Incidental 9 mm hypodense lesion within the right hepatic lobe too small to characterize  - F/u MR abdomen  - GI Dr. Ponce consulted

## 2020-05-15 NOTE — H&P ADULT - PROBLEM SELECTOR PLAN 5
- Incidental 9 mm hypodense lesion within the right hepatic lobe too small to characterize  - F/u outpatient Per chart review, pt w/ h/o Afib. Pt admits to being off medication after following up with outpatient Cardiologist Dr. Sarah.

## 2020-05-15 NOTE — H&P ADULT - PROBLEM SELECTOR PLAN 3
Continue home Atorvastatin 10mg daily As per pt, avid runner(running ~5 miles every Sunday/working out every day) and admits to low HR at baseline

## 2020-05-15 NOTE — CONSULT NOTE ADULT - ASSESSMENT
All as above.  Patient exceedingly pleasant though somewhat poor historian.  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  In any case, original Pathology reports and imaging results are being sought by this service.  Patient continues to improve slowly but decidedly with good pain control by Acetaminophen.  If doing better, no objection to clear liquids in am and advancement of diet as tolerated.  No plans for surgical intervention at this time as he is clinically improving and intussusception has resolved on subsequent imaging.  However, following discharge patient understands the need for outpatient imaging follow-up including MRE and +/- nuclear medicine imaging (pending confirmation of prior oncologic diagnosis.

## 2020-05-15 NOTE — ED ADULT NURSE REASSESSMENT NOTE - NS ED NURSE REASSESS COMMENT FT1
pt complaining of increased pain 8/10, vitals are as charted, md silver made aware of pain, 0.55 dilaudid to be given IV as per MD

## 2020-05-15 NOTE — H&P ADULT - PROBLEM SELECTOR PLAN 1
Admit to [facility]  - s/p Dilaudid .5mg X2, Dilaudid .5mg X1, Protonix 40 IV X1, Zofran 4 mg X1, NS 1000 ml bolus X1  - CT abd/pelvis: 1. Small bowel intussusception within the left pelvis, possibly reflecting source of patient's abdominal pain. No bowel obstruction. Further evaluation with follow-up Octreoscan is recommended.  - Tylenol PRN for mild pain, Tramadol for Mod PRN for pain, Dilaudid PRN for severe pain  - Zofran PRN for nausea, ED QTc 416  - Diet NPO for poss OR  - Continue IVF @130ml/hr in setting of no diet  - F/u MRI abd w and w/o IV cont Admit to [facility]  - s/p Dilaudid .5mg X2, Dilaudid .5mg X1, Protonix 40 IV X1, Zofran 4 mg X1, NS 1000 ml bolus X1  - Afebrile, no leukocytosis, Lactate wnl   - CT abd/pelvis: 1. Small bowel intussusception within the left pelvis, possibly reflecting source of patient's abdominal pain. No bowel obstruction.   - Tylenol PRN for mild pain, Tramadol for Mod PRN for pain, Dilaudid PRN for severe pain  - Zofran PRN for nausea, ED QTc 416  - Diet NPO for poss OR  - Continue IVF @130ml/hr in setting of no diet  - Surgery Dr. Gutierrez consulted   - F/u MRI abd w/ and w/o IV cont Admit to F  - s/p Dilaudid .5mg X2, Dilaudid .5mg X1, Protonix 40 IV X1, Zofran 4 mg X1, NS 1000 ml bolus X1  - Afebrile, no leukocytosis, Lactate wnl   - CT abd/pelvis: 1. Small bowel intussusception within the left pelvis, possibly reflecting source of patient's abdominal pain. No bowel obstruction.   - Unclear if intussusception or ischemic mesentery is source of abdominal pain  - Tylenol PRN for mild pain, Tramadol for Mod PRN for pain, Dilaudid PRN for severe pain  - Zofran PRN for nausea, ED QTc 416  - Diet NPO for poss OR  - Continue IVF @130ml/hr in setting of no diet  - Surgery Dr. Gutierrez consulted   - F/u MRI abd w/ and w/o IV cont Admit to F  - s/p Dilaudid .5mg X2, Dilaudid .5mg X1, Protonix 40 IV X1, Zofran 4 mg X1, NS 1000 ml bolus X1  - Afebrile, no leukocytosis, Lactate wnl   - CT abd/pelvis: 1. Small bowel intussusception within the left pelvis, possibly reflecting source of patient's abdominal pain. No bowel obstruction.   - Unclear if intussusception or ischemic mesentery is source of abdominal pain  - Tylenol PRN for mild pain, Tramadol for Mod PRN for pain, Dilaudid PRN for severe pain  - Zofran PRN for nausea, ED QTc 416  - Diet NPO for poss OR  - Continue IVF @130ml/hr in setting of no diet  - Surgery Dr. Gutierrez consulted   - GI Dr. Ponce consulted  - F/u MRI abd w/ and w/o IV cont Admit to F  - s/p Dilaudid .5mg X2, Dilaudid .5mg X1, Protonix 40 IV X1, Zofran 4 mg X1, NS 1000 ml bolus X1  - Afebrile, no leukocytosis, Lactate wnl   - CT abd/pelvis: 1. Small bowel intussusception within the left pelvis, possibly reflecting source of patient's abdominal pain. No bowel obstruction.   - Unclear if intussusception or ischemic mesentery is source of abdominal pain  - Tylenol PRN for mild pain, Tramadol for Mod PRN for pain, Dilaudid PRN for severe pain  - Zofran PRN for nausea, ED QTc 416  - Diet NPO except meds for poss OR  - Continue IVF @130ml/hr in setting of no diet  - Surgery Dr. Gutierrez consulted   - GI Dr. Ponce consulted  - F/u MRI abd w/ and w/o IV cont

## 2020-05-15 NOTE — H&P ADULT - ATTENDING COMMENTS
Pt is a 55 yo M presenting with abodminal pain found to have possible intusussception and liver lesion and also . Indeterminate enhancing 1.4 cm soft tissue nodule in the left abdominal mesentery. PMH carcinoid tumor, A fib s/p ablation, HLD, hx of GIB.   Patient has epigastric abdominal pain that comes and goes, 6/10 in severity, sharp, he has decreased PO intake as a result, no radiation of pain. Last BM was yesterday morining and well formed, no melena or hematochezia. Denies headaches, chest pain, SOB, palpitations, constipation, diarrhea, melena, hematochezia, dysuria. He is passing gas.   Remainder as above.     T(C): 36.7 (05-15-20 @ 09:30), Max: 37 (05-15-20 @ 04:20)  HR: 52 (05-15-20 @ 09:30) (44 - 54)  BP: 125/70 (05-15-20 @ 09:30) (125/70 - 139/64)  RR: 16 (05-15-20 @ 09:30) (16 - 17)  SpO2: 98% (05-15-20 @ 09:30) (98% - 100%)  Wt(kg): --    Physical Exam:   GENERAL: well-groomed, well-developed, NAD  HEENT: head NC/AT; EOM intact, PERRLA, conjunctiva & sclera clear; hearing grossly intact, moist mucous membranes  NECK: supple, no JVD  RESPIRATORY: CTA B/L, no wheezing, rales, rhonchi or rubs  CARDIOVASCULAR: S1&S2, RRR, no murmurs or gallops  ABDOMEN: soft, TTP in epigastric region, non-distended, + Bowel sounds x4 quadrants, no guarding, rebound or rigidity, no echymosses  MUSCULOSKELETAL:  no clubbing, cyanosis or edema of all 4 extremities  LYMPH: no cervical lymphadenopathy  VASCULAR: Radial pulses 2+ bilaterally, no varicose veins   SKIN: warm and dry, color normal  NEUROLOGIC: AA&O X3, CN2-12 intact w/ no focal deficits, no sensory loss, motor Strength 5/5 in UE & LE B/L  Psych: Normal mood and affect, normal behavior    Plan:   Abdominal pain: ? intususception seen on CT scan.   -await further surgical recommendations.   -no signs or symptoms of SBO.   -continue NPO diet and IVF.     Liver lesion: patient informed of his liver lesion and will also consult GI as inpatient.     A fib: EKG is sinus bradycardia.   -he is s/p ablation.     HLD: continue statin when not NPO    Soft tissue nodule: Indeterminate enhancing 1.4 cm soft tissue nodule in the left abdominal mesentery. Patient informed that given his hx malignancy cannot be excluded. Will obtain MR abdomen as recommended by surgery after discussion with radiology as Octreoscan not available at our facility.   -further recommendtions pending GI and surgical recommendations.     DVT ppx: SCD's.

## 2020-05-15 NOTE — H&P ADULT - NSHPPHYSICALEXAM_GEN_ALL_CORE
Vital Signs Last 24 Hrs  T(C): 36.7 (15 May 2020 09:30), Max: 37 (15 May 2020 04:20)  T(F): 98 (15 May 2020 09:30), Max: 98.6 (15 May 2020 04:20)  HR: 52 (15 May 2020 09:30) (44 - 54)  BP: 125/70 (15 May 2020 09:30) (125/70 - 139/64)  BP(mean): --  RR: 16 (15 May 2020 09:30) (16 - 17)  SpO2: 98% (15 May 2020 09:30) (98% - 100%)    Physical Exam:  General: Well developed, well nourished, NAD  HEENT: NCAT, PERRLA, EOMI b/l, dry mucous membranes   Neurology: A&Ox3, moving all extremities b/l  Respiratory: CTA B/L, No W/R/R  CV: Bradycardic, +S1/S2, no murmurs, rubs or gallops  Abdominal: +TTP RLQ and LLQ. +BS in all 4 quadrants  Extremities: No C/C/E, +2 DT pulses b/l  MSK: Normal ROM, no joint erythema or warmth, no joint swelling

## 2020-05-15 NOTE — ED ADULT NURSE NOTE - NSIMPLEMENTINTERV_GEN_ALL_ED
Implemented All Universal Safety Interventions:  [place] to call system. Call bell, personal items and telephone within reach. Instruct patient to call for assistance. Room bathroom lighting operational. Non-slip footwear when patient is off stretcher. Physically safe environment: no spills, clutter or unnecessary equipment. Stretcher in lowest position, wheels locked, appropriate side rails in place.

## 2020-05-15 NOTE — H&P ADULT - HISTORY OF PRESENT ILLNESS
55 y/o M PMHx of Afib, carcinoid surgery and tumor resection 2008    ED course:   Vitals: T 98.2, HR 54, /74, O2 100 on RA  Significant labs: T-bili 1.8  UA neg  U/S gallbladder: Unremarkable  CT abd/pelvis: 1. Small bowel intussusception within the left pelvis, possibly reflecting source of patient's abdominal pain. No bowel obstruction.  2. Indeterminate enhancing 1.4 cm soft tissue nodule in the left abdominal mesentery. Carcinoid metastatic lesion is not excluded given patient's history. Further evaluation with follow-up Octreoscan is recommended. 55 y/o M PMHx of Afib, GI bleed, carcinoid surgery and tumor resection 2008    53 yo male PMHx Carcinoid tumor-s/p SBR, partial gastrectomy/duodenectomy, A-fib s/p Ablation 2009 & 2019 GIB presented to the ED with abdominal pain....  ED course:   Vitals: T 98.2, HR 54, /74, O2 100 on RA  Significant labs: T-bili 1.8  UA neg  U/S gallbladder: Unremarkable  CT abd/pelvis: 1. Small bowel intussusception within the left pelvis, possibly reflecting source of patient's abdominal pain. No bowel obstruction.  2. Indeterminate enhancing 1.4 cm soft tissue nodule in the left abdominal mesentery. Carcinoid metastatic lesion is not excluded given patient's history. Further evaluation with follow-up Octreoscan is recommended.  In the ED patient recieved: Dilaudid .5 X2, Dilaudid .55 X1, Protonix 40 IV X1, Zofran 4 mg X1, NS 1000 ml bolus X1 53 y/o M PMHx of Afib, Carcinoid tumor s/p resection in 2008, HLD, presents with sharp, constant, 8/10 worsening RLQ and LLQ abdominal pain radiating to the flank b/l that started around 1PM yesterday. States pain started when he ate a tuna bagel sandwich and continued to worsen. Reports going to urgent care later that day and had labs performed, which were negative, and was told to avoid solid foods and that he likely had pancreatitis. Abdominal pain continued to worsen, so pt came to ED. Admits to some nausea, but denies any recent fevers, chills, vomiting, headaches, dizziness, chest pain, shortness of breath, diarrhea, constipation, dysuria, or hematuria. Last BM yesterday morning. Denies passing flatus. Denies any sick contacts. Lives at home with wife and children. Of note, pt admits being an avid runner(running ~5 miles every Sunday/working out every day) and states his HR usually runs low.    53 yo male PMHx Carcinoid tumor-s/p SBR, partial gastrectomy/duodenectomy, A-fib s/p Ablation 2009 & 2019 GIB presented to the ED with abdominal pain....  ED course:   Vitals: T 98.2, HR 54, /74, O2 100 on RA  Significant labs: T-bili 1.8  UA neg  EKG: Bradycardia, QTc 416  U/S gallbladder: Unremarkable  CT abd/pelvis: 1. Small bowel intussusception within the left pelvis, possibly reflecting source of patient's abdominal pain. No bowel obstruction.  2. Indeterminate enhancing 1.4 cm soft tissue nodule in the left abdominal mesentery. Carcinoid metastatic lesion is not excluded given patient's history. Further evaluation with follow-up Octreoscan is recommended.  In the ED patient recieved: Dilaudid .5 X2, Dilaudid .55 X1, Protonix 40 IV X1, Zofran 4 mg X1, NS 1000 ml bolus X1 55 y/o M PMHx of Afib, Carcinoid tumor s/p resection in 2008, hx of NSAID related GIB s/p partial gastrectomy HLD, presents with sharp, constant, 8/10 worsening RLQ and LLQ abdominal pain radiating to the flank b/l that started around 1PM yesterday. States pain started when he ate a tuna bagel sandwich and continued to worsen. Reports going to urgent care later that day and had labs performed, which were negative, and was told to avoid solid foods and that he likely had pancreatitis. Abdominal pain continued to worsen, so pt came to ED. Admits to some nausea, but denies any recent fevers, chills, vomiting, headaches, dizziness, chest pain, shortness of breath, diarrhea, constipation, dysuria, or hematuria. Last BM yesterday morning and well formed. Denies passing flatus. Denies any sick contacts. Lives at home with wife and children. Of note, pt admits being an avid runner(running ~5 miles every Sunday/working out every day) and states his HR usually runs low.    53 yo male PMHx Carcinoid tumor-s/p SBR, partial gastrectomy/duodenectomy, A-fib s/p Ablation 2009 & 2019 GIB presented to the ED with abdominal pain....  ED course:   Vitals: T 98.2, HR 54, /74, O2 100 on RA  Significant labs: T-bili 1.8  UA neg  EKG: Bradycardia, QTc 416  U/S gallbladder: Unremarkable  CT abd/pelvis: 1. Small bowel intussusception within the left pelvis, possibly reflecting source of patient's abdominal pain. No bowel obstruction.  2. Indeterminate enhancing 1.4 cm soft tissue nodule in the left abdominal mesentery. Carcinoid metastatic lesion is not excluded given patient's history. Further evaluation with follow-up Octreoscan is recommended.  In the ED patient recieved: Dilaudid .5 X2, Dilaudid .55 X1, Protonix 40 IV X1, Zofran 4 mg X1, NS 1000 ml bolus X1 55 y/o M PMHx of Afib, Carcinoid tumor s/p resection in 2008, hx of NSAID related GIB s/p partial gastrectomy HLD, presents with sharp, constant, 8/10 worsening RLQ and LLQ abdominal pain radiating to the flank b/l that started around 1PM yesterday. States pain started when he ate a tuna bagel sandwich and continued to worsen. Reports going to urgent care later that day and had labs performed, which were negative, and was told to avoid solid foods and that he likely had pancreatitis. Abdominal pain continued to worsen, so pt came to ED. Admits to some nausea, but denies any recent fevers, chills, vomiting, headaches, dizziness, chest pain, shortness of breath, diarrhea, constipation, dysuria, or hematuria. Last BM yesterday morning and well formed. Denies any sick contacts. Lives at home with wife and children. Of note, pt admits being an avid runner(running ~5 miles every Sunday/working out every day) and states his HR usually runs low.    53 yo male PMHx Carcinoid tumor-s/p SBR, partial gastrectomy/duodenectomy, A-fib s/p Ablation 2009 & 2019 GIB presented to the ED with abdominal pain....  ED course:   Vitals: T 98.2, HR 54, /74, O2 100 on RA  Significant labs: T-bili 1.8  UA neg  EKG: Bradycardia, QTc 416  U/S gallbladder: Unremarkable  CT abd/pelvis: 1. Small bowel intussusception within the left pelvis, possibly reflecting source of patient's abdominal pain. No bowel obstruction.  2. Indeterminate enhancing 1.4 cm soft tissue nodule in the left abdominal mesentery. Carcinoid metastatic lesion is not excluded given patient's history. Further evaluation with follow-up Octreoscan is recommended.  In the ED patient recieved: Dilaudid .5 X2, Dilaudid .55 X1, Protonix 40 IV X1, Zofran 4 mg X1, NS 1000 ml bolus X1 55 yo male PMHx Carcinoid tumor-s/p SBR, partial gastrectomy/duodenectomy, A-fib s/p Ablation 2009 & 2019, GIB, HLD presented to the ED with sharp, constant, 8/10 worsening RLQ and LLQ abdominal pain radiating to the flank b/l that started around 1PM yesterday. States pain started when he ate a tuna bagel sandwich and continued to worsen. Reports going to urgent care later that day and had labs performed, which were negative, and was told to avoid solid foods and that he likely had pancreatitis. Abdominal pain continued to worsen, so pt came to ED. Admits to some nausea, but denies any recent fevers, chills, vomiting, headaches, dizziness, chest pain, shortness of breath, diarrhea, constipation, dysuria, or hematuria. Last BM yesterday morning and well formed. Denies any sick contacts. Lives at home with wife and children. Of note, pt admits being an avid runner(running ~5 miles every Sunday/working out every day) and states his HR usually runs low.    ED course:   Vitals: T 98.2, HR 54, /74, O2 100 on RA  Significant labs: T-bili 1.8  UA neg  EKG: Bradycardia, QTc 416  U/S gallbladder: Unremarkable  CT abd/pelvis: 1. Small bowel intussusception within the left pelvis, possibly reflecting source of patient's abdominal pain. No bowel obstruction.  2. Indeterminate enhancing 1.4 cm soft tissue nodule in the left abdominal mesentery. Carcinoid metastatic lesion is not excluded given patient's history. Further evaluation with follow-up Octreoscan is recommended.  In the ED patient recieved: Dilaudid .5 X2, Dilaudid .55 X1, Protonix 40 IV X1, Zofran 4 mg X1, NS 1000 ml bolus X1

## 2020-05-15 NOTE — CONSULT NOTE ADULT - SUBJECTIVE AND OBJECTIVE BOX
[facility]  Gucci Ellis PA-C  [address] [place] [address]  [phone]      Chief Complaint:  Patient is a 54y old  Male who presents with a chief complaint of Abdominal pain (15 May 2020 09:45)      HPI: 53 yo male PMHx Carcinoid tumor-s/p SBR, partial gastrectomy/duodenectomy, A-fib s/p Ablation  & 2019, GIB, HLD presented to the ED with sharp, constant, 8/10 worsening RLQ and LLQ abdominal pain radiating to the flank b/l that started around 1PM yesterday. States pain started when he ate a tuna bagel sandwich and continued to worsen. Reports going to urgent care later that day and had labs performed, which were negative, and was told to avoid solid foods and that he likely had pancreatitis. Abdominal pain continued to worsen, so pt came to ED. Admits to some nausea, but denies any recent fevers, chills, vomiting, headaches, dizziness, chest pain, shortness of breath, diarrhea, constipation, dysuria, or hematuria. Last BM yesterday morning and well formed. Denies any sick contacts. Lives at home with wife and children. Of note, pt admits being an avid runner(running ~5 miles every /working out every day) and states his HR usually runs low.    Allergies:  Motrin (Other)  PC Pen VK (Unknown)      Medications:  acetaminophen   Tablet .. 650 milliGRAM(s) Oral every 4 hours PRN  atorvastatin 10 milliGRAM(s) Oral at bedtime  ferrous    sulfate 325 milliGRAM(s) Oral daily  HYDROmorphone  Injectable 0.5 milliGRAM(s) IV Push every 6 hours PRN  ondansetron Injectable 4 milliGRAM(s) IV Push every 6 hours PRN  sodium chloride 0.9%. 1000 milliLiter(s) IV Continuous <Continuous>  traMADol 50 milliGRAM(s) Oral every 6 hours PRN      PMHX/PSHX:  Afib  Carcinoid tumor  No significant past surgical history      Family history:  FHx: myocardial infarction  No pertinent family history in first degree relatives      Social History: no toxic habits      ROS:     General:  No wt loss, fevers, chills, night sweats, fatigue,   Eyes:  Good vision, no reported pain  ENT:  No sore throat, pain, runny nose, dysphagia  CV:  No pain, palpitations, hypo/hypertension  Resp:  No dyspnea, cough, tachypnea, wheezing  GI:  + pain, No nausea, No vomiting, No diarrhea, No constipation, No weight loss, No fever, No pruritis, No rectal bleeding, No tarry stools, No dysphagia,  :  No pain, bleeding, incontinence, nocturia  Muscle:  No pain, weakness  Neuro:  No weakness, tingling, memory problems  Psych:  No fatigue, insomnia, mood problems, depression  Endocrine:  No polyuria, polydipsia, cold/heat intolerance  Heme:  No petechiae, ecchymosis, easy bruisability  Skin:  No rash, tattoos, scars, edema      PHYSICAL EXAM:   Vital Signs:  Vital Signs Last 24 Hrs  T(C): 36.7 (15 May 2020 15:27), Max: 37 (15 May 2020 04:20)  T(F): 98 (15 May 2020 15:27), Max: 98.6 (15 May 2020 04:20)  HR: 51 (15 May 2020 15:27) (44 - 54)  BP: 106/59 (15 May 2020 15:27) (106/59 - 139/64)  BP(mean): --  RR: 16 (15 May 2020 15:27) (16 - 17)  SpO2: 99% (15 May 2020 15:27) (98% - 100%)  Daily Height in cm: 195.58 (15 May 2020 00:06)    Daily     GENERAL:  Appears stated age, well-groomed, well-nourished, no distress  HEENT:  NC/AT,  conjunctivae clear and pink, no thyromegaly, nodules, adenopathy, no JVD, sclera -anicteric  CHEST:  Full & symmetric excursion, no increased effort, breath sounds clear  HEART:  Regular rhythm, S1, S2, no murmur/rub/S3/S4, no abdominal bruit, no edema  ABDOMEN:  Soft, non-tender, non-distended, normoactive bowel sounds,  no masses ,no hepato-splenomegaly, no signs of chronic liver disease  EXTEREMITIES:  no cyanosis,clubbing or edema  SKIN:  No rash/erythema/ecchymoses/petechiae/wounds/abscess/warm/dry  NEURO:  Alert, oriented, no asterixis, no tremor, no encephalopathy    LABS:                        14.6   7.28  )-----------( 188      ( 15 May 2020 01:27 )             41.9     05-15    141  |  104  |  16  ----------------------------<  104<H>  4.1   |  30  |  1.10    Ca    8.9      15 May 2020 01:27    TPro  6.9  /  Alb  3.8  /  TBili  1.8<H>  /  DBili  x   /  AST  22  /  ALT  29  /  AlkPhos  50  05-15    LIVER FUNCTIONS - ( 15 May 2020 01:27 )  Alb: 3.8 g/dL / Pro: 6.9 g/dL / ALK PHOS: 50 U/L / ALT: 29 U/L / AST: 22 U/L / GGT: x           PT/INR - ( 15 May 2020 01:27 )   PT: 12.1 sec;   INR: 1.08 ratio         PTT - ( 15 May 2020 01:27 )  PTT:31.9 sec  Urinalysis Basic - ( 15 May 2020 04:16 )    Color: Yellow / Appearance: Clear / S.010 / pH: x  Gluc: x / Ketone: Negative  / Bili: Negative / Urobili: Negative   Blood: x / Protein: Negative / Nitrite: Negative   Leuk Esterase: Negative / RBC: x / WBC x   Sq Epi: x / Non Sq Epi: x / Bacteria: x      Amylase Serum51      Lipase [id number]       Ammonia--      Imaging:

## 2020-05-15 NOTE — CONSULT NOTE ADULT - SUBJECTIVE AND OBJECTIVE BOX
This is a 54 Y.O. M who presents to [place] ED with 1 day hx of abdominal pain. Pt has a significant hx of carcinoid tumor which was incidentally found on CT after patient was admitted for GI bleed due to NSAIDS in . Pt underwent partial gastrectomy, small bowel resection and reports partial duodenectomy was performed in  at [facility] by Dr. Doll. Pt reports he has been asymptomatic since without GI complaints. Pt reports yesterday around 1 pm he experienced sharp pain after eating (bagel w/tuna) to mid-abdominal region with radiated to RUQ and bilateral lower quadrants and back. Pt reports he went to [facility] and was told to report to the ED. Pt was seen in the ED overnight and up until this am, symptoms were severe. Pt reports pain was to bilateral lower quadrants, exquisitely tender to palpation unrelieved by change in position or alleviating factors. Pt reports brief episode of associated nausea. Denies any vomiting. Denies any change in bowel habits. Denies any constipation/diarrhea/hematochezia/melana. Last BM- yesterday am- formed BM. Presently patient reports degree of pain is decreased to a feeling of "achiness" after working out to bilateral lower quadrants. Reports last colonoscopy was 5 years ago (does not recall gastroenterologist name). Denies any fevers/chills/dysuria/denies hx of of renal stones. [place] any sick contacts. Denies any cough/fevers/sputum production/headaches/visual changes.    s/p 1 L bolus, and dilaudid 0.5 mg dilaudid x 2- with relief of symptoms.     Pt with hx of A-fib-s/p Ablation in  and more recently in 2019- with Dr. Trevizo- here at [place] in ED, remains in NSR since- not on any AC and has omitted need for Toprol. Last Echo reportedly wnl 2019. Denies any palpitations/SOB/Chest pain.    PAST MEDICAL & SURGICAL HISTORY:  Afib- s/p Ablation  and 2019  Hx of Carcinoid tumor- s/p partial gastrectomy, SBR and partial duodenectomy - [facility]-Dr. Doll    FAMILY HISTORY:  No pertinent family history in first degree relatives      Denies Tobacco  Occasional Etoh use- 1-2 times a week  Denies Drug use    Motrin (Other)  PC Pen VK (Unknown)    Home Medications:  atorvastatin:  (29 Mar 2019 07:03)      REVIEW OF SYSTEM:  CONSTITUTIONAL: No weakness, fevers or chills  EYES/ENT: No visual changes;  No vertigo or throat pain.  NECK: No pain or stiffness  RESPIRATORY: No cough, wheezing, hemoptysis; No shortness of breath  CARDIOVASCULAR: No chest pain or palpitations  GASTROINTESTINAL: See HPI  GENITOURINARY: No dysuria, frequency or hematuria  NEUROLOGICAL: No numbness or weakness  SKIN: No itching, burning, rashes, or lesions   All other review of systems is negative unless indicated above.    Allergic/Immunologic:	    Vital Signs Last 24 Hrs  T(C): 37 (15 May 2020 04:20), Max: 37 (15 May 2020 04:20)  T(F): 98.6 (15 May 2020 04:20), Max: 98.6 (15 May 2020 04:20)  HR: 54 (15 May 2020 05:40) (44 - 54)  BP: 134/66 (15 May 2020 05:40) (132/74 - 139/64)  BP(mean): --  RR: 16 (15 May 2020 05:40) (16 - 17)  SpO2: 99% (15 May 2020 05:40) (98% - 100%)    PHYSICAL EXAM:  GENERAL: NAD, well-groomed, well-developed  HEAD:  Atraumatic, Normocephalic  EYES: EOMI, conjunctiva and sclera clear  HEART: Regular rate and rhythm-bradycardic; No murmurs, rubs, or gallops  RESPIRATORY: CTA B/L, No W/R/R  ABDOMEN: Well healed scar to RUQ region, abdomen soft, Nontender, Nondistended, Bowel sounds present, no hernias appreciated, (+) TTP to deep palpation to LLQ and RLQ region. No rebound. No guarding. No peritoneal signs.   NEUROLOGY: A&Ox3, nonfocal, no gait abnormalities bilaterally;  EXTREMITIES: Calves soft bilaterally, no ttp  SKIN: warm, dry, normal color, no rash or abnormal lesions                            14.6   7.28  )-----------( 188      ( 15 May 2020 01:27 )             41.9       05-15    141  |  104  |  16  ----------------------------<  104<H>  4.1   |  30  |  1.10    Ca    8.9      15 May 2020 01:27    TPro  6.9  /  Alb  3.8  /  TBili  1.8<H>  /  DBili  x   /  AST  22  /  ALT  29  /  AlkPhos  50  05-15      PT/INR - ( 15 May 2020 01:27 )   PT: 12.1 sec;   INR: 1.08 ratio         PTT - ( 15 May 2020 01:27 )  PTT:31.9 sec    LIVER FUNCTIONS - ( 15 May 2020 01:27 )  Alb: 3.8 g/dL / Pro: 6.9 g/dL / ALK PHOS: 50 U/L / ALT: 29 U/L / AST: 22 U/L / GGT: x             Urinalysis Basic - ( 15 May 2020 04:16 )    Color: Yellow / Appearance: Clear / S.010 / pH: x  Gluc: x / Ketone: Negative  / Bili: Negative / Urobili: Negative   Blood: x / Protein: Negative / Nitrite: Negative   Leuk Esterase: Negative / RBC: x / WBC x   Sq Epi: x / Non Sq Epi: x / Bacteria: x        CAPILLARY BLOOD GLUCOSE    Radiology Findings:   < from: CT Abdomen and Pelvis w/ Oral Cont and w/ IV Cont (05.15.20 @ 04:04) >  NTERPRETATION:  CLINICAL INFORMATION: Upper and lower abdominal pain. History of carcinoid status post resection in .    COMPARISON: None.    PROCEDURE:   CT of the Abdomen and Pelvis was performed with intravenous contrast.   Intravenous contrast: 95 ml Omnipaque 350. 5 ml discarded.  Oral contrast: positive contrast was administered.  Sagittal and coronal reformats were performed.    FINDINGS:    LOWER CHEST: Within normal limits.    LIVER: 9 mm hypodense lesion within the right hepatic lobe too small to characterize.  BILE DUCTS: Normal caliber.  GALLBLADDER: Within normal limits.  SPLEEN: Within normal limits.  PANCREAS: Within normallimits.  ADRENALS: Within normal limits.  KIDNEYS/URETERS: Subcentimeter left renal hypodensity too small characterize. No hydronephrosis. Normal renal enhancement.    BLADDER: Within normal limits.  REPRODUCTIVE ORGANS: Prostate normal in size.    BOWEL: Status post partial gastrectomy No bowel obstruction. Appendix is normal. There is a small bowel to small bowel intussusception within the left pelvis with vessels and mesenteric fat pulled into the intussusception (example 2, 97).  PERITONEUM: No ascites.   VESSELS: Atherosclerotic changes.  RETROPERITONEUM/LYMPH NODES: 1.4 cm enhancing soft tissue nodule in the left mid abdomen (602, 42).   ABDOMINAL WALL: Within normal limits.  BONES: Within normal limits.    IMPRESSION:       1. Small bowel-small bowel intussusception within the left pelvis, possibly reflecting source of patient's abdominal pain. No bowel obstruction.  2. Indeterminate enhancing 1.4 cm soft tissue nodule in the left abdominal mesentery. Carcinoid metastatic lesion isnot excluded given patient's history. Further evaluation with follow-up Octreoscan is recommended.      < from: US Gallbladder (05.15.20 @ 01:47) >  XAM:  US GALLBLADDER                            PROCEDURE DATE:  05/15/2020          INTERPRETATION:  EXAM: Sonogram of the gallbladder.    CLINICAL INFORMATION: Pain. History of duodenal carcinoid.    COMPARISON: None available.    TECHNIQUE: Real-time sonogram of the gallbladder with grayscale and color Doppler imaging.    FINDINGS:    The common bile duct measures 4 mm.  Normal physiologic distention of the gallbladder without gallstone, gallbladder wall thickening or pericholecystic fluid.Gallbladder wall measures 2 mm. Small fold of the gallbladder is suggested. Negative Forbes's sign documented.    IMPRESSION: Unremarkable sonogram of the gallbladder.      < end of copied text >        A/P 54 Y.O. M with PMHx significant for Carcinoid tumor-s/p SBR, partial gastrectomy/duodenectomy, A-fib s/p Ablation  & 2019-now in NSR, hx of GIB due to NSAIDs- with 1 day hx of abdominal pain with CT Scan findings of Small bowel-small bowel intussusception within the left pelvis, No bowel obstruction, Indeterminate enhancing 1.4 cm soft tissue nodule in the left abdominal mesentery and 9 mm hypodense lesion within the right hepatic lobe.    -Pt clinically improved presently with no evidence of obstruction or displaying clinical signs of ischemia, however will obtain Lactate  -NPO w/IVF, close observation  -Pt will need further investigation with imaging to evaluate mesenteric and hepatic lesions- Unable to perform Octreoscan here, will discuss options for imaging with Radiology  -Continue Analgesia/Anti-emetics  -Trend Tbili, LFT's normal, no GB pathology on US.  -Above discussed with Dr. Gutierrez  -Will follow This is a 54 Y.O. M who presents to [place] ED with 1 day hx of abdominal pain. Pt has a significant hx of carcinoid tumor which was incidentally found on CT after patient was admitted for GI bleed due to NSAIDS in . Pt underwent partial gastrectomy, small bowel resection and reports partial duodenectomy was performed in  at [facility] by Dr. Doll. Pt reports he has been asymptomatic since without GI complaints. Pt reports yesterday around 1 pm he experienced sharp pain after eating (bagel w/tuna) to mid-abdominal region with radiated to RUQ and bilateral lower quadrants and back. Pt reports he went to [facility] and was told to report to the ED. Pt was seen in the ED overnight and up until this am, symptoms were severe. Pt reports pain was to bilateral lower quadrants, exquisitely tender to palpation unrelieved by change in position or alleviating factors. Pt reports brief episode of associated nausea. Denies any vomiting. Denies any change in bowel habits. Denies any constipation/diarrhea/hematochezia/melana. Last BM- yesterday am- formed BM. Presently patient reports degree of pain is decreased to a feeling of "achiness" after working out to bilateral lower quadrants. Reports last colonoscopy was 5 years ago (does not recall gastroenterologist name). Denies any fevers/chills/dysuria/denies hx of of renal stones. [place] any sick contacts. Denies any cough/fevers/sputum production/headaches/visual changes.    s/p 1 L bolus, and dilaudid 0.5 mg dilaudid x 2- with relief of symptoms.     Pt with hx of A-fib-s/p Ablation in  and more recently in 2019- with Dr. Trevizo- here at [place] in ED, remains in NSR since- not on any AC and has omitted need for Toprol. Last Echo reportedly wnl 2019. Denies any palpitations/SOB/Chest pain.    PAST MEDICAL & SURGICAL HISTORY:  Afib- s/p Ablation  and 2019  Hx of Carcinoid tumor- s/p partial gastrectomy, SBR and partial duodenectomy - [facility]-Dr. Doll    FAMILY HISTORY:  No pertinent family history in first degree relatives      Denies Tobacco  Occasional Etoh use- 1-2 times a week  Denies Drug use    Motrin (Other)  PC Pen VK (Unknown)    Home Medications:  atorvastatin:  (29 Mar 2019 07:03)      REVIEW OF SYSTEM:  CONSTITUTIONAL: No weakness, fevers or chills  EYES/ENT: No visual changes;  No vertigo or throat pain.  NECK: No pain or stiffness  RESPIRATORY: No cough, wheezing, hemoptysis; No shortness of breath  CARDIOVASCULAR: No chest pain or palpitations  GASTROINTESTINAL: See HPI  GENITOURINARY: No dysuria, frequency or hematuria  NEUROLOGICAL: No numbness or weakness  SKIN: No itching, burning, rashes, or lesions   All other review of systems is negative unless indicated above.    Allergic/Immunologic:	    Vital Signs Last 24 Hrs  T(C): 37 (15 May 2020 04:20), Max: 37 (15 May 2020 04:20)  T(F): 98.6 (15 May 2020 04:20), Max: 98.6 (15 May 2020 04:20)  HR: 54 (15 May 2020 05:40) (44 - 54)  BP: 134/66 (15 May 2020 05:40) (132/74 - 139/64)  BP(mean): --  RR: 16 (15 May 2020 05:40) (16 - 17)  SpO2: 99% (15 May 2020 05:40) (98% - 100%)    PHYSICAL EXAM:  GENERAL: NAD, well-groomed, well-developed  HEAD:  Atraumatic, Normocephalic  EYES: EOMI, conjunctiva and sclera clear  HEART: Regular rate and rhythm-bradycardic; No murmurs, rubs, or gallops  RESPIRATORY: CTA B/L, No W/R/R  ABDOMEN: Well healed scar to RUQ region, abdomen soft, Nontender, Nondistended, Bowel sounds present, no hernias appreciated, (+) TTP to deep palpation to LLQ and RLQ region. No rebound. No guarding. No peritoneal signs.   NEUROLOGY: A&Ox3, nonfocal, no gait abnormalities bilaterally;  EXTREMITIES: Calves soft bilaterally, no ttp  SKIN: warm, dry, normal color, no rash or abnormal lesions                            14.6   7.28  )-----------( 188      ( 15 May 2020 01:27 )             41.9       05-15    141  |  104  |  16  ----------------------------<  104<H>  4.1   |  30  |  1.10    Ca    8.9      15 May 2020 01:27    TPro  6.9  /  Alb  3.8  /  TBili  1.8<H>  /  DBili  x   /  AST  22  /  ALT  29  /  AlkPhos  50  05-15      PT/INR - ( 15 May 2020 01:27 )   PT: 12.1 sec;   INR: 1.08 ratio         PTT - ( 15 May 2020 01:27 )  PTT:31.9 sec    LIVER FUNCTIONS - ( 15 May 2020 01:27 )  Alb: 3.8 g/dL / Pro: 6.9 g/dL / ALK PHOS: 50 U/L / ALT: 29 U/L / AST: 22 U/L / GGT: x             Urinalysis Basic - ( 15 May 2020 04:16 )    Color: Yellow / Appearance: Clear / S.010 / pH: x  Gluc: x / Ketone: Negative  / Bili: Negative / Urobili: Negative   Blood: x / Protein: Negative / Nitrite: Negative   Leuk Esterase: Negative / RBC: x / WBC x   Sq Epi: x / Non Sq Epi: x / Bacteria: x        CAPILLARY BLOOD GLUCOSE    Radiology Findings:   < from: CT Abdomen and Pelvis w/ Oral Cont and w/ IV Cont (05.15.20 @ 04:04) >  NTERPRETATION:  CLINICAL INFORMATION: Upper and lower abdominal pain. History of carcinoid status post resection in .    COMPARISON: None.    PROCEDURE:   CT of the Abdomen and Pelvis was performed with intravenous contrast.   Intravenous contrast: 95 ml Omnipaque 350. 5 ml discarded.  Oral contrast: positive contrast was administered.  Sagittal and coronal reformats were performed.    FINDINGS:    LOWER CHEST: Within normal limits.    LIVER: 9 mm hypodense lesion within the right hepatic lobe too small to characterize.  BILE DUCTS: Normal caliber.  GALLBLADDER: Within normal limits.  SPLEEN: Within normal limits.  PANCREAS: Within normallimits.  ADRENALS: Within normal limits.  KIDNEYS/URETERS: Subcentimeter left renal hypodensity too small characterize. No hydronephrosis. Normal renal enhancement.    BLADDER: Within normal limits.  REPRODUCTIVE ORGANS: Prostate normal in size.    BOWEL: Status post partial gastrectomy No bowel obstruction. Appendix is normal. There is a small bowel to small bowel intussusception within the left pelvis with vessels and mesenteric fat pulled into the intussusception (example 2, 97).  PERITONEUM: No ascites.   VESSELS: Atherosclerotic changes.  RETROPERITONEUM/LYMPH NODES: 1.4 cm enhancing soft tissue nodule in the left mid abdomen (602, 42).   ABDOMINAL WALL: Within normal limits.  BONES: Within normal limits.    IMPRESSION:       1. Small bowel-small bowel intussusception within the left pelvis, possibly reflecting source of patient's abdominal pain. No bowel obstruction.  2. Indeterminate enhancing 1.4 cm soft tissue nodule in the left abdominal mesentery. Carcinoid metastatic lesion isnot excluded given patient's history. Further evaluation with follow-up Octreoscan is recommended.      < from: US Gallbladder (05.15.20 @ 01:47) >  XAM:  US GALLBLADDER                            PROCEDURE DATE:  05/15/2020          INTERPRETATION:  EXAM: Sonogram of the gallbladder.    CLINICAL INFORMATION: Pain. History of duodenal carcinoid.    COMPARISON: None available.    TECHNIQUE: Real-time sonogram of the gallbladder with grayscale and color Doppler imaging.    FINDINGS:    The common bile duct measures 4 mm.  Normal physiologic distention of the gallbladder without gallstone, gallbladder wall thickening or pericholecystic fluid.Gallbladder wall measures 2 mm. Small fold of the gallbladder is suggested. Negative Forbes's sign documented.    IMPRESSION: Unremarkable sonogram of the gallbladder.      < end of copied text >        A/P 54 Y.O. M with PMHx significant for Carcinoid tumor-s/p SBR, partial gastrectomy/duodenectomy, A-fib s/p Ablation  & 2019-now in NSR, hx of GIB due to NSAIDs- with 1 day hx of abdominal pain with CT Scan findings of Small bowel-small bowel intussusception within the left pelvis, No bowel obstruction, Indeterminate enhancing 1.4 cm soft tissue nodule in the left abdominal mesentery and 9 mm hypodense lesion within the right hepatic lobe.    -Pt clinically improved presently with no evidence of obstruction or displaying clinical signs of ischemia, however will obtain Lactate  -NPO w/IVF, close observation  -Pt will need further investigation with imaging to evaluate mesenteric and hepatic lesions- Unable to perform Octreoscan here, will discuss options for imaging with Radiology- Pt to undergo MRI Abd/pelvis w/ & w/o contrast to further delineate liver and mesenteric lesions  -Continue Analgesia/Anti-emetics  -Trend Tbili, LFT's normal, no GB pathology on US.  -Above discussed with Dr. Gutierrez  -Will follow    < from: MR Abdomen w/wo IV Cont (05.15.20 @ 12:03) >  IMPRESSION:    Resolution of pelvic small bowel intussusception withouta definite lead point mass identified.    Small nonspecific nodule in the small bowel mesentery again noted measuring 1.1 cm. Lymph node versus metastasis remains in the differential. Consider follow-up imaging with gallium Dotatate PET/CT if there is continued concern for a somatostatin receptor bearing lesion.    1.1 cm lesion in the right anterior segment of the liver remains indeterminate for solid enhancement. Review of prior imaging may be helpful to document stability.      Attempted to reach Dr. Reddy (gastroenterologist)  at [facility] and Dr. Bonilla (surgeon) for Path report and CT scan from  to evaluate for comparison of liver lesion. Pt reports Pathology was ultimately negative for carcinoid and more consistent with ectopic-pancreatic tissue but will attempt to report. This is a 54 Y.O. M who presents to [place] ED with 1 day hx of abdominal pain. Pt has a significant hx of carcinoid tumor which was incidentally found on CT after patient was admitted for GI bleed due to NSAIDS in . Pt underwent partial gastrectomy, small bowel resection and reports partial duodenectomy was performed in  at [facility] by Dr. Doll. Pt reports he has been asymptomatic since without GI complaints. Pt reports yesterday around 1 pm he experienced sharp pain after eating (bagel w/tuna) to mid-abdominal region with radiated to RUQ and bilateral lower quadrants and back. Pt reports he went to [facility] and was told to report to the ED. Pt was seen in the ED overnight and up until this am, symptoms were severe. Pt reports pain was to bilateral lower quadrants, exquisitely tender to palpation unrelieved by change in position or alleviating factors. Pt reports brief episode of associated nausea. Denies any vomiting. Denies any change in bowel habits. Denies any constipation/diarrhea/hematochezia/melana. Last BM- yesterday am- formed BM. Presently patient reports degree of pain is decreased to a feeling of "achiness" after working out to bilateral lower quadrants. Reports last colonoscopy was 5 years ago (does not recall gastroenterologist name). Denies any fevers/chills/dysuria/denies hx of of renal stones. [place] any sick contacts. Denies any cough/fevers/sputum production/headaches/visual changes.      s/p 1 L bolus, and dilaudid 0.5 mg dilaudid x 2- with relief of symptoms.       Pt with hx of A-fib-s/p Ablation in  and more recently in 2019- with Dr. Trevizo- here at [place] in ED, remains in NSR since- not on any AC and has omitted need for Toprol. Last Echo reportedly wnl 2019. Denies any palpitations/SOB/Chest pain.      PAST MEDICAL & SURGICAL HISTORY:  Afib- s/p Ablation  and 2019  Hx of Carcinoid tumor- s/p partial gastrectomy, SBR and partial duodenectomy - [facility]-Dr. Doll      FAMILY HISTORY:  No pertinent family history in first degree relatives      Denies Tobacco  Occasional Etoh use- 1-2 times a week  Denies Drug use      Motrin (Other)  PC Pen VK (Unknown)      Home Medications:  atorvastatin:  (29 Mar 2019 07:03)      REVIEW OF SYSTEM:  CONSTITUTIONAL: No weakness, fevers or chills  EYES/ENT: No visual changes;  No vertigo or throat pain.  NECK: No pain or stiffness  RESPIRATORY: No cough, wheezing, hemoptysis; No shortness of breath  CARDIOVASCULAR: No chest pain or palpitations  GASTROINTESTINAL: See HPI  GENITOURINARY: No dysuria, frequency or hematuria  NEUROLOGICAL: No numbness or weakness  SKIN: No itching, burning, rashes, or lesions   All other review of systems is negative unless indicated above.      Allergic/Immunologic:	      Vital Signs Last 24 Hrs  T(C): 37 (15 May 2020 04:20), Max: 37 (15 May 2020 04:20)  T(F): 98.6 (15 May 2020 04:20), Max: 98.6 (15 May 2020 04:20)  HR: 54 (15 May 2020 05:40) (44 - 54)  BP: 134/66 (15 May 2020 05:40) (132/74 - 139/64)  BP(mean): --  RR: 16 (15 May 2020 05:40) (16 - 17)  SpO2: 99% (15 May 2020 05:40) (98% - 100%)      PHYSICAL EXAM:  GENERAL: NAD, well-groomed, well-developed  HEAD:  Atraumatic, Normocephalic  EYES: EOMI, conjunctiva and sclera clear  HEART: Regular rate and rhythm-bradycardic; No murmurs, rubs, or gallops  RESPIRATORY: CTA B/L, No W/R/R  ABDOMEN: Well healed scar to RUQ region, abdomen soft, Nontender, Nondistended, Bowel sounds present, no hernias appreciated, (+) TTP to deep palpation to LLQ and RLQ region. No rebound. No guarding. No peritoneal signs.   NEUROLOGY: A&Ox3, nonfocal, no gait abnormalities bilaterally;  EXTREMITIES: Calves soft bilaterally, no ttp  SKIN: warm, dry, normal color, no rash or abnormal lesions                          14.6   7.28  )-----------( 188      ( 15 May 2020 01:27 )             41.9       05-15    141  |  104  |  16  ----------------------------<  104<H>  4.1   |  30  |  1.10    Ca    8.9      15 May 2020 01:27    TPro  6.9  /  Alb  3.8  /  TBili  1.8<H>  /  DBili  x   /  AST  22  /  ALT  29  /  AlkPhos  50  05-15      PT/INR - ( 15 May 2020 01:27 )   PT: 12.1 sec;   INR: 1.08 ratio    PTT - ( 15 May 2020 01:27 )  PTT:31.9 sec      LIVER FUNCTIONS - ( 15 May 2020 01:27 )  Alb: 3.8 g/dL / Pro: 6.9 g/dL / ALK PHOS: 50 U/L / ALT: 29 U/L / AST: 22 U/L / GGT: x             Urinalysis Basic - ( 15 May 2020 04:16 )  Color: Yellow / Appearance: Clear / S.010 / pH: x  Gluc: x / Ketone: Negative  / Bili: Negative / Urobili: Negative   Blood: x / Protein: Negative / Nitrite: Negative   Leuk Esterase: Negative / RBC: x / WBC x   Sq Epi: x / Non Sq Epi: x / Bacteria: x      Radiology Findings:   < from: CT Abdomen and Pelvis w/ Oral Cont and w/ IV Cont (05.15.20 @ 04:04) >  NTERPRETATION:  CLINICAL INFORMATION: Upper and lower abdominal pain. History of carcinoid status post resection in .    COMPARISON: None.    PROCEDURE:   CT of the Abdomen and Pelvis was performed with intravenous contrast.   Intravenous contrast: 95 ml Omnipaque 350. 5 ml discarded.  Oral contrast: positive contrast was administered.  Sagittal and coronal reformats were performed.    FINDINGS:    LOWER CHEST: Within normal limits.    LIVER: 9 mm hypodense lesion within the right hepatic lobe too small to characterize.  BILE DUCTS: Normal caliber.  GALLBLADDER: Within normal limits.  SPLEEN: Within normal limits.  PANCREAS: Within normallimits.  ADRENALS: Within normal limits.  KIDNEYS/URETERS: Subcentimeter left renal hypodensity too small characterize. No hydronephrosis. Normal renal enhancement.    BLADDER: Within normal limits.  REPRODUCTIVE ORGANS: Prostate normal in size.    BOWEL: Status post partial gastrectomy No bowel obstruction. Appendix is normal. There is a small bowel to small bowel intussusception within the left pelvis with vessels and mesenteric fat pulled into the intussusception (example 2, 97).  PERITONEUM: No ascites.   VESSELS: Atherosclerotic changes.  RETROPERITONEUM/LYMPH NODES: 1.4 cm enhancing soft tissue nodule in the left mid abdomen (602, 42).   ABDOMINAL WALL: Within normal limits.  BONES: Within normal limits.    IMPRESSION:       1. Small bowel-small bowel intussusception within the left pelvis, possibly reflecting source of patient's abdominal pain. No bowel obstruction.  2. Indeterminate enhancing 1.4 cm soft tissue nodule in the left abdominal mesentery. Carcinoid metastatic lesion isnot excluded given patient's history. Further evaluation with follow-up Octreoscan is recommended.      < from: US Gallbladder (05.15.20 @ 01:47) >  XAM:  US GALLBLADDER                            PROCEDURE DATE:  05/15/2020          INTERPRETATION:  EXAM: Sonogram of the gallbladder.    CLINICAL INFORMATION: Pain. History of duodenal carcinoid.    COMPARISON: None available.    TECHNIQUE: Real-time sonogram of the gallbladder with grayscale and color Doppler imaging.    FINDINGS:    The common bile duct measures 4 mm.  Normal physiologic distention of the gallbladder without gallstone, gallbladder wall thickening or pericholecystic fluid.Gallbladder wall measures 2 mm. Small fold of the gallbladder is suggested. Negative Forbes's sign daily walksocumented.    IMPRESSION: Unremarkable sonogram of the gallbladder.      < end of copied text >      A/P 54 Y.O. M with PMHx significant for Carcinoid tumor-s/p SBR, partial gastrectomy/duodenectomy, A-fib s/p Ablation  & 2019-now in NSR, hx of GIB due to NSAIDs- with 1 day hx of abdominal pain with CT Scan findings of Small bowel-small bowel intussusception within the left pelvis, No bowel obstruction, Indeterminate enhancing 1.4 cm soft tissue nodule in the left abdominal mesentery and 9 mm hypodense lesion within the right hepatic lobe.  -Pt clinically improved presently with no evidence of obstruction or displaying clinical signs of ischemia, however will obtain Lactate  -NPO w/IVF, close observation  -Pt will need further investigation with imaging to evaluate mesenteric and hepatic lesions- Unable to perform Octreoscan here, will discuss options for imaging with Radiology- Pt to undergo MRI Abd/pelvis w/ & w/o contrast to further delineate liver and mesenteric lesions  -Continue Analgesia/Anti-emetics  -Trend Tbili, LFT's normal, no GB pathology on US.  -Above discussed with Dr. Gutierrez  -Will follow    < from: MR Abdomen w/wo IV Cont (05.15.20 @ 12:03) >  IMPRESSION:    Resolution of pelvic small bowel intussusception withouta definite lead point mass identified.    Small nonspecific nodule in the small bowel mesentery again noted measuring 1.1 cm. Lymph node versus metastasis remains in the differential. Consider follow-up imaging with gallium Dotatate PET/CT if there is continued concern for a somatostatin receptor bearing lesion.    1.1 cm lesion in the right anterior segment of the liver remains indeterminate for solid enhancement. Review of prior imaging may be helpful to document stability.      Attempted to reach Dr. Reddy (gastroenterologist)  at [facility] and Dr. Bonilla (surgeon) for Path report and CT scan from  to evaluate for comparison of liver lesion. Pt reports Pathology was ultimately negative for carcinoid and more consistent with ectopic-pancreatic tissue but will attempt to report.

## 2020-05-15 NOTE — H&P ADULT - PROBLEM SELECTOR PLAN 7
IMPROVE VTE Individual Risk Assessment          RISK                                                          Points  [  ] Previous VTE                                                3  [  ] Thrombophilia                                             2  [  ] Lower limb paralysis                                   2        (unable to hold up >15 seconds)    [  ] Current Cancer                                             2         (within 6 months)  [  ] Immobilization > 24 hrs                              1  [  ] ICU/CCU stay > 24 hours                             1  [  ] Age > 60                                                         1    IMPROVE VTE Score: 0     DVT PPX: Will hold AC for poss OR. SCDs in place

## 2020-05-15 NOTE — H&P ADULT - PROBLEM SELECTOR PLAN 4
Per chart review, pt w/ h/o Afib. Pt admits to being off medication after following up with outpatient Cardiologist Dr. Sarah. Continue home Atorvastatin 10mg daily

## 2020-05-15 NOTE — ED ADULT NURSE NOTE - OBJECTIVE STATEMENT
received pt fromtriage awake alert c/o abd pain blood work drawned and sentto lab pocontrast tolerated well sono andct scah

## 2020-05-15 NOTE — ED ADULT TRIAGE NOTE - CHIEF COMPLAINT QUOTE
my stomach since this afternoon. I went o urgent care and was told to come to ED, but it was fine now its worse. _Pt points to mid abdomin epigastric. denies NVD

## 2020-05-15 NOTE — ED PROVIDER NOTE - CARE PLAN
Principal Discharge DX:	Abdominal pain Principal Discharge DX:	Abdominal pain  Secondary Diagnosis:	Intussusception of small bowel  Secondary Diagnosis:	Carcinoid tumor

## 2020-05-15 NOTE — H&P ADULT - NSHPSOCIALHISTORY_GEN_ALL_CORE
Denies smoking or using rec drugs. Admits to occasional EtOH. Lives with wife and children at home.   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 An avid runner(running ~5 miles every Sunday/working out every day)  Denies receiving flu shot.

## 2020-05-15 NOTE — ED PROVIDER NOTE - OBJECTIVE STATEMENT
53 y/o male h/o carcinoid surgery and tumor resection 2008, GIB secondary to NSAIDS .  C/O upper abdominal pain, back pain x 1 day, was seen at urgent and was told that he may have pancreatitis . He had increasing pain x 5 hours and came to the ED.    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  C/O upper abdominal pain, back pain x 1 day, was seen at urgent and was told that he may have pancreatitis, lab testing sent . He had increasing pain x 5 hours and came to the ED. No CP, no SOB, no fever, no chills, no N/V, no urinary symptoms, no COVID symptoms.    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  C/O diffuse abdominal pain, back pain x 1 day, he was seen at urgent care and the came to the with  increasing pain for the past 5 hours. No CP, no SOB, no fever, no chills, no Nausea, no vomiting, no diarrhea, no GIB,  no urinary symptoms, no COVID symptoms.    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

## 2020-05-15 NOTE — ED ADULT NURSE REASSESSMENT NOTE - NS ED NURSE REASSESS COMMENT FT1
pt resting in bed, pain 2/10, does not need pain meds, a/o x 4, resp even and unlabored, vitals are as charted, awaiting covid test and bed status, aware of plan of care, and will continue to monitor

## 2020-05-15 NOTE — CONSULT NOTE ADULT - PROBLEM SELECTOR RECOMMENDATION 9
CT scan reviewed   intussusception seen on CT which appears resolved on MRI  advance to clears; if pain free soft in am and dc home with outpatient follow up

## 2020-05-15 NOTE — H&P ADULT - ASSESSMENT
53 y/o M PMHx of Afib, Carcinoid tumor s/p resection in 2008, HLD, presents with sharp, constant, 8/10 worsening RLQ and LLQ abdominal pain radiating to the flank b/l that started around 1PM yesterday. Admitted for Intussusception of small bowel. 53 yo male PMHx Carcinoid tumor-s/p SBR, partial gastrectomy/duodenectomy, A-fib s/p Ablation 2009 & 2019, GIB, HLD presented to the ED with abdominal pain. Admitted for Intussusception of small bowel.

## 2020-05-15 NOTE — ED PROVIDER NOTE - SIGNIFICANT NEGATIVE FINDINGS
no headache, no chest pain, no SOB, no palpitations, no n/v/d, no urinary symptoms, no GIB bleeding. no neuro changes.

## 2020-05-15 NOTE — H&P ADULT - PROBLEM SELECTOR PLAN 2
- CT abd/pelvis: Indeterminate enhancing 1.4 cm soft tissue nodule in the left abdominal mesentery. Carcinoid metastatic lesion is not excluded given patient's history  - F/u outpatient - CT abd/pelvis: Indeterminate enhancing 1.4 cm soft tissue nodule in the left abdominal mesentery. Carcinoid metastatic lesion is not excluded given patient's history  - patient informed of finding and will obtain MRI abdomen and patient instructed he will also need to f/u as outpatient given hx past hx of carcinoid tumor

## 2020-05-15 NOTE — ED PROVIDER NOTE - CLINICAL SUMMARY MEDICAL DECISION MAKING FREE TEXT BOX
acute upper abdominal pain with radiation to the back , comes to the ED after urgent care eval to r/o pancreatitis labs US GB and CT scan scheduled acute  abdominal pain with radiation to the back , no nausea, no vomiting h/o carcinoid surgery  IVF  labs US GB,  CT scan, analgesia and surgical consult

## 2020-05-15 NOTE — ED ADULT NURSE REASSESSMENT NOTE - NS ED NURSE REASSESS COMMENT FT1
pt with pain scale 8 and MD aware pt medicated with dilaudid .5 ng ivp and zofran 4 mg ivp as ordered vital signs stable

## 2020-05-15 NOTE — CONSULT NOTE ADULT - PROBLEM SELECTOR RECOMMENDATION 2
nodule seen on MRI  will need outpatient gallium Dotatate PET/CT  this test can be done at [facility]  this was discussed with patient who agrees  office information provided to patient

## 2020-05-16 ENCOUNTER — TRANSCRIPTION ENCOUNTER (OUTPATIENT)
Age: 55
End: 2020-05-16

## 2020-05-16 VITALS — RESPIRATION RATE: 16 BRPM | HEART RATE: 56 BPM | DIASTOLIC BLOOD PRESSURE: 60 MMHG | SYSTOLIC BLOOD PRESSURE: 98 MMHG

## 2020-05-16 LAB
ALBUMIN SERPL ELPH-MCNC: 3.2 G/DL — LOW (ref 3.3–5)
ALP SERPL-CCNC: 44 U/L — SIGNIFICANT CHANGE UP (ref 40–120)
ALT FLD-CCNC: 22 U/L — SIGNIFICANT CHANGE UP (ref 12–78)
ANION GAP SERPL CALC-SCNC: 6 MMOL/L — SIGNIFICANT CHANGE UP (ref 5–17)
AST SERPL-CCNC: 16 U/L — SIGNIFICANT CHANGE UP (ref 15–37)
BILIRUB SERPL-MCNC: 2.5 MG/DL — HIGH (ref 0.2–1.2)
BUN SERPL-MCNC: 13 MG/DL — SIGNIFICANT CHANGE UP (ref 7–23)
CALCIUM SERPL-MCNC: 8.3 MG/DL — LOW (ref 8.5–10.1)
CHLORIDE SERPL-SCNC: 106 MMOL/L — SIGNIFICANT CHANGE UP (ref 96–108)
CO2 SERPL-SCNC: 30 MMOL/L — SIGNIFICANT CHANGE UP (ref 22–31)
CREAT SERPL-MCNC: 1.1 MG/DL — SIGNIFICANT CHANGE UP (ref 0.5–1.3)
GLUCOSE SERPL-MCNC: 85 MG/DL — SIGNIFICANT CHANGE UP (ref 70–99)
HCT VFR BLD CALC: 39 % — SIGNIFICANT CHANGE UP (ref 39–50)
HCV AB S/CO SERPL IA: 0.35 S/CO — SIGNIFICANT CHANGE UP (ref 0–0.99)
HCV AB SERPL-IMP: SIGNIFICANT CHANGE UP
HGB BLD-MCNC: 13.7 G/DL — SIGNIFICANT CHANGE UP (ref 13–17)
MCHC RBC-ENTMCNC: 32 PG — SIGNIFICANT CHANGE UP (ref 27–34)
MCHC RBC-ENTMCNC: 35.1 GM/DL — SIGNIFICANT CHANGE UP (ref 32–36)
MCV RBC AUTO: 91.1 FL — SIGNIFICANT CHANGE UP (ref 80–100)
NRBC # BLD: 0 /100 WBCS — SIGNIFICANT CHANGE UP (ref 0–0)
PLATELET # BLD AUTO: 157 K/UL — SIGNIFICANT CHANGE UP (ref 150–400)
POTASSIUM SERPL-MCNC: 4 MMOL/L — SIGNIFICANT CHANGE UP (ref 3.5–5.3)
POTASSIUM SERPL-SCNC: 4 MMOL/L — SIGNIFICANT CHANGE UP (ref 3.5–5.3)
PROT SERPL-MCNC: 5.9 G/DL — LOW (ref 6–8.3)
RBC # BLD: 4.28 M/UL — SIGNIFICANT CHANGE UP (ref 4.2–5.8)
RBC # FLD: 12.3 % — SIGNIFICANT CHANGE UP (ref 10.3–14.5)
SODIUM SERPL-SCNC: 142 MMOL/L — SIGNIFICANT CHANGE UP (ref 135–145)
WBC # BLD: 5.97 K/UL — SIGNIFICANT CHANGE UP (ref 3.8–10.5)
WBC # FLD AUTO: 5.97 K/UL — SIGNIFICANT CHANGE UP (ref 3.8–10.5)

## 2020-05-16 PROCEDURE — 85730 THROMBOPLASTIN TIME PARTIAL: CPT

## 2020-05-16 PROCEDURE — 93005 ELECTROCARDIOGRAM TRACING: CPT

## 2020-05-16 PROCEDURE — 86803 HEPATITIS C AB TEST: CPT

## 2020-05-16 PROCEDURE — 86850 RBC ANTIBODY SCREEN: CPT

## 2020-05-16 PROCEDURE — 96374 THER/PROPH/DIAG INJ IV PUSH: CPT

## 2020-05-16 PROCEDURE — 85610 PROTHROMBIN TIME: CPT

## 2020-05-16 PROCEDURE — 99053 MED SERV 10PM-8AM 24 HR FAC: CPT

## 2020-05-16 PROCEDURE — 96376 TX/PRO/DX INJ SAME DRUG ADON: CPT

## 2020-05-16 PROCEDURE — 80053 COMPREHEN METABOLIC PANEL: CPT

## 2020-05-16 PROCEDURE — 96361 HYDRATE IV INFUSION ADD-ON: CPT

## 2020-05-16 PROCEDURE — 81003 URINALYSIS AUTO W/O SCOPE: CPT

## 2020-05-16 PROCEDURE — 83605 ASSAY OF LACTIC ACID: CPT

## 2020-05-16 PROCEDURE — 83690 ASSAY OF LIPASE: CPT

## 2020-05-16 PROCEDURE — 74183 MRI ABD W/O CNTR FLWD CNTR: CPT

## 2020-05-16 PROCEDURE — 82150 ASSAY OF AMYLASE: CPT

## 2020-05-16 PROCEDURE — 85027 COMPLETE CBC AUTOMATED: CPT

## 2020-05-16 PROCEDURE — 96375 TX/PRO/DX INJ NEW DRUG ADDON: CPT

## 2020-05-16 PROCEDURE — 86900 BLOOD TYPING SEROLOGIC ABO: CPT

## 2020-05-16 PROCEDURE — 86901 BLOOD TYPING SEROLOGIC RH(D): CPT

## 2020-05-16 PROCEDURE — 99285 EMERGENCY DEPT VISIT HI MDM: CPT | Mod: 25

## 2020-05-16 PROCEDURE — 76705 ECHO EXAM OF ABDOMEN: CPT

## 2020-05-16 PROCEDURE — 99239 HOSP IP/OBS DSCHRG MGMT >30: CPT

## 2020-05-16 PROCEDURE — 74177 CT ABD & PELVIS W/CONTRAST: CPT

## 2020-05-16 PROCEDURE — U0003: CPT

## 2020-05-16 PROCEDURE — 36415 COLL VENOUS BLD VENIPUNCTURE: CPT

## 2020-05-16 PROCEDURE — A9579: CPT

## 2020-05-16 RX ADMIN — Medication 325 MILLIGRAM(S): at 12:28

## 2020-05-16 NOTE — DISCHARGE NOTE PROVIDER - CARE PROVIDER_API CALL
Mike Palma  INTERNAL MEDICINE  [address] [place] [address]  Phone: [phone]  Fax: [phone]  Follow Up Time: 1 week    Vlad Gutierrez  SURGERY  [address] [place] [address]  Phone: [phone]  Fax: [phone]  Follow Up Time: Routine    Matt Connors  INTERNAL MEDICINE  [address]  [address]  [place] [address]  Phone: [phone]  Fax: [phone]  Established Patient  Follow Up Time: 1 week

## 2020-05-16 NOTE — PROGRESS NOTE ADULT - PROBLEM SELECTOR PLAN 3
As per pt, avid runner(running ~5 miles every Sunday/working out every day) and admits to low HR at baseline

## 2020-05-16 NOTE — PROGRESS NOTE ADULT - SUBJECTIVE AND OBJECTIVE BOX
HOD # 1    SUBJECTIVE:  55 y/o M seen and examined at bedside. Pt offering no complaints at this time, states abd pain has resolved since yesterday and in NAD. No acute overnight events noted. Pt currently NPO denies recent bowel movement however admits to flatus. Pt denies any fevers, chills, chest pain, shortness of breath, abdominal pain, nausea, vomiting or diarrhea.    Vital Signs Last 24 Hrs  T(C): 36.7 (16 May 2020 04:44), Max: 37.3 (15 May 2020 20:31)  T(F): 98.1 (16 May 2020 04:44), Max: 99.1 (15 May 2020 20:31)  HR: 48 (16 May 2020 04:44) (46 - 52)  BP: 98/60 (16 May 2020 04:44) (98/60 - 135/75)  BP(mean): --  RR: 16 (16 May 2020 04:44) (16 - 17)  SpO2: 95% (16 May 2020 04:44) (95% - 99%)    PHYSICAL EXAM:  GENERAL: No acute distress, well-developed  CHEST/LUNG: CTABL, no ronchi, rales or wheezing  HEART: RRR, no MRG  ABDOMEN: Soft,nontender, nondistended, +bs  NEUROLOGY: A&O x 3, no focal deficits    I&O's Summary    I&O's Detail    MEDICATIONS  (STANDING):  atorvastatin 10 milliGRAM(s) Oral at bedtime  ferrous    sulfate 325 milliGRAM(s) Oral daily    MEDICATIONS  (PRN):  acetaminophen   Tablet .. 650 milliGRAM(s) Oral every 4 hours PRN Mild Pain (1 - 3)  HYDROmorphone  Injectable 0.5 milliGRAM(s) IV Push every 6 hours PRN Severe Pain (7 - 10)  ondansetron Injectable 4 milliGRAM(s) IV Push every 6 hours PRN Nausea  traMADol 50 milliGRAM(s) Oral every 6 hours PRN Moderate Pain (4 - 6)    LABS:                        13.7   5.97  )-----------( 157      ( 16 May 2020 06:29 )             39.0     05-16    x   |  x   |  13  ----------------------------<  85  x    |  30  |  1.10    Ca    8.9      15 May 2020 01:27    TPro  x   /  Alb  3.2<L>  /  TBili  x   /  DBili  x   /  AST  16  /  ALT  22  /  AlkPhos  x   05-16    PT/INR - ( 15 May 2020 01:27 )   PT: 12.1 sec;   INR: 1.08 ratio         PTT - ( 15 May 2020 01:27 )  PTT:31.9 sec  Urinalysis Basic - ( 15 May 2020 04:16 )    Color: Yellow / Appearance: Clear / S.010 / pH: x  Gluc: x / Ketone: Negative  / Bili: Negative / Urobili: Negative   Blood: x / Protein: Negative / Nitrite: Negative   Leuk Esterase: Negative / RBC: x / WBC x   Sq Epi: x / Non Sq Epi: x / Bacteria: x    ASSESSMENT  55 y/o M HOD # 1 admitted for poss intussusception with resolution seen on repeat imaging, currently NPO however without abdominal pain and passing flatus,    PLAN  - adv to CLD, f/u diet tolerance  - cont care per primary team  - pain control, supportive care  - OOB, ambulation as tolerated  - serial abdominal exams  - labs in am  - to be discussed with Dr. Gutierrez    Surgical Team Contact Information  Spectralink: Ext: [phone] or [phone]  Pager: [phone]

## 2020-05-16 NOTE — DISCHARGE NOTE NURSING/CASE MANAGEMENT/SOCIAL WORK - PATIENT PORTAL LINK FT
You can access the FollowMyHealth Patient Portal offered by [facility] by registering at the following website: [email or web address]. By joining [facility]’s FollowMyHealth portal, you will also be able to view your health information using other applications (apps) compatible with our system.

## 2020-05-16 NOTE — DISCHARGE NOTE PROVIDER - CARE PROVIDERS DIRECT ADDRESSES
,DirectAddress_Unknown,[email or web address],DirectAddress_Unknown

## 2020-05-16 NOTE — PROGRESS NOTE ADULT - PROBLEM SELECTOR PLAN 5
Per chart review, pt w/ h/o Afib. Pt admits to being off medication after following up with outpatient Cardiologist Dr. Sarah.

## 2020-05-16 NOTE — PROGRESS NOTE ADULT - SUBJECTIVE AND OBJECTIVE BOX
Patient is a 54y old  Male who presents with a chief complaint of Abdominal pain (15 May 2020 15:59)      FROM ADMISSION H+P:   HPI:  55 yo male PMHx Carcinoid tumor-s/p SBR, partial gastrectomy/duodenectomy, A-fib s/p Ablation  & 2019, GIB, HLD presented to the ED with sharp, constant, 8/10 worsening RLQ and LLQ abdominal pain radiating to the flank b/l that started around 1PM yesterday. States pain started when he ate a tuna bagel sandwich and continued to worsen. Reports going to urgent care later that day and had labs performed, which were negative, and was told to avoid solid foods and that he likely had pancreatitis. Abdominal pain continued to worsen, so pt came to ED. Admits to some nausea, but denies any recent fevers, chills, vomiting, headaches, dizziness, chest pain, shortness of breath, diarrhea, constipation, dysuria, or hematuria. Last BM yesterday morning and well formed. Denies any sick contacts. Lives at home with wife and children. Of note, pt admits being an avid runner(running ~5 miles every /working out every day) and states his HR usually runs low.    ED course:   Vitals: T 98.2, HR 54, /74, O2 100 on RA  Significant labs: T-bili 1.8  UA neg  EKG: Bradycardia, QTc 416  U/S gallbladder: Unremarkable  CT abd/pelvis: 1. Small bowel intussusception within the left pelvis, possibly reflecting source of patient's abdominal pain. No bowel obstruction.  2. Indeterminate enhancing 1.4 cm soft tissue nodule in the left abdominal mesentery. Carcinoid metastatic lesion is not excluded given patient's history. Further evaluation with follow-up Octreoscan is recommended.  In the ED patient recieved: Dilaudid .5 X2, Dilaudid .55 X1, Protonix 40 IV X1, Zofran 4 mg X1, NS 1000 ml bolus X1 (15 May 2020 09:45)      ----  INTERVAL HPI/OVERNIGHT EVENTS: Pt seen and evaluated at the bedside. No acute overnight events occurred.     ----  PAST MEDICAL & SURGICAL HISTORY:  Afib  Carcinoid tumor: s/p resection in       FAMILY HISTORY:  FHx: myocardial infarction: Father- 43      ----  MEDICATIONS  (STANDING):  atorvastatin 10 milliGRAM(s) Oral at bedtime  ferrous    sulfate 325 milliGRAM(s) Oral daily  sodium chloride 0.9%. 1000 milliLiter(s) (130 mL/Hr) IV Continuous <Continuous>    MEDICATIONS  (PRN):  acetaminophen   Tablet .. 650 milliGRAM(s) Oral every 4 hours PRN Mild Pain (1 - 3)  HYDROmorphone  Injectable 0.5 milliGRAM(s) IV Push every 6 hours PRN Severe Pain (7 - 10)  ondansetron Injectable 4 milliGRAM(s) IV Push every 6 hours PRN Nausea  traMADol 50 milliGRAM(s) Oral every 6 hours PRN Moderate Pain (4 - 6)      ----  REVIEW OF SYSTEMS:  CONSTITUTIONAL: denies fever, chills, fatigue, weakness  HEENT: denies blurred vision, sore throat  SKIN: denies new lesions, rash  CARDIOVASCULAR: denies chest pain, chest pressure, palpitations  RESPIRATORY: denies shortness of breath, sputum production  GASTROINTESTINAL: admits mild abdominal pain denies nausea, vomiting, diarrhea  GENITOURINARY: denies dysuria, discharge  NEUROLOGICAL: denies numbness, headache, focal weakness  MUSCULOSKELETAL: denies new joint pain, muscle aches  HEMATOLOGIC: denies gross bleeding, bruising  ENDOCRINOLOGIC: denies sweating, cold or heat intolerance    ----  PHYSICAL EXAM:  GENERAL: patient appears well, no acute distress, appropriate, pleasant  EYES: sclera clear, no exudates  ENMT: oropharynx clear without erythema, no exudates, moist mucous membranes  NECK: supple, soft, no thyromegaly noted  LUNGS: good air entry bilaterally, clear to auscultation, symmetric breath sounds, no wheezing or rhonchi appreciated  HEART: soft S1/S2, regular rate and rhythm, no murmurs noted, no lower extremity edema  GASTROINTESTINAL: abdomen is soft, nontender, nondistended, mild ttp b/l LQ  INTEGUMENT: good skin turgor, no lesions noted  MUSCULOSKELETAL: no clubbing or cyanosis, no obvious deformity  NEUROLOGIC: awake, alert, oriented x3, good muscle tone in 4 extremities, no obvious sensory deficits  PSYCHIATRIC: mood is good, affect is congruent, linear and logical thought process  HEME/LYMPH: no palpable supraclavicular nodules, no obvious ecchymosis or petechiae     T(C): 36.7 (20 @ 04:44), Max: 37.3 (05-15-20 @ 20:31)  HR: 48 (20 @ 04:44) (46 - 52)  BP: 98/60 (20 @ 04:44) (98/60 - 135/75)  RR: 16 (20 @ 04:44) (16 - 17)  SpO2: 95% (20 @ 04:44) (95% - 99%)  Wt(kg): --    ----  I&O's Summary      LABS:                        13.7   5.97  )-----------( 157      ( 16 May 2020 06:29 )             39.0     05-15    141  |  104  |  16  ----------------------------<  104<H>  4.1   |  30  |  1.10    Ca    8.9      15 May 2020 01:27    TPro  6.9  /  Alb  3.8  /  TBili  1.8<H>  /  DBili  x   /  AST  22  /  ALT  29  /  AlkPhos  50  05-15    PT/INR - ( 15 May 2020 01:27 )   PT: 12.1 sec;   INR: 1.08 ratio         PTT - ( 15 May 2020 01:27 )  PTT:31.9 sec  Urinalysis Basic - ( 15 May 2020 04:16 )    Color: Yellow / Appearance: Clear / S.010 / pH: x  Gluc: x / Ketone: Negative  / Bili: Negative / Urobili: Negative   Blood: x / Protein: Negative / Nitrite: Negative   Leuk Esterase: Negative / RBC: x / WBC x   Sq Epi: x / Non Sq Epi: x / Bacteria: x      CAPILLARY BLOOD GLUCOSE    < from: MR Abdomen w/wo IV Cont (05.15.20 @ 12:03) >  EXAM:  MR ABDOMEN WAW IC                            PROCEDURE DATE:  05/15/2020          INTERPRETATION:  MR ABDOMEN WITHOUT AND WITH IV CONTRAST    CLINICAL INFORMATION: Small bowel intussusception, mesenteric mass. History of carcinoid tumor status post gastrectomy in .    TECHNIQUE: Multiplanar T1-weighted, T2-weighted, and diffusion weighted sequences were obtained of the abdomen, including dynamic post-contrast T1-weighted sequences.  3D heavily T2-weighted thin-section MRCP was also performed.    Field Strength: 1.5T    Contrast: 9 cc Gadavist.    COMPARISON: CT abdomen and pelvis from the same day.    FINDINGS:    LOWER CHEST: Clear.    LIVER: 1.1 cm lesion in the right anterior segment near the dome remains indeterminate on MRI.  BILE DUCTS: Nondilated.  GALLBLADDER: Normal.  SPLEEN: Normal.  PANCREAS: Normal.  ADRENALS: Normal.  KIDNEYS/URETERS: Symmetric nephrograms. No hydronephrosis.    VISUALIZED PORTIONS:    BOWEL: Stable postsurgical anatomy status post distal gastrectomy and gastrojejunostomy. No obstruction. Previously seen short segment intussusception in the pelvic small bowel is no longer identified. No definite lead point mass is identified. Small nonspecific nodule in the small bowel mesentery again noted measuring 1.1 cm.  PERITONEUM: No ascites.  VESSELS:  Normal caliber aorta.  RETROPERITONEUM/LYMPH NODES: No adenopathy.    ABDOMINAL WALL: Normal.  BONES: No aggressive lesion.    IMPRESSION:    Resolution of pelvic small bowel intussusception withouta definite lead point mass identified.    Small nonspecific nodule in the small bowel mesentery again noted measuring 1.1 cm. Lymph node versus metastasis remains in the differential. Consider follow-up imaging with gallium Dotatate PET/CT if there is continued concern for a somatostatin receptor bearing lesion.    1.1 cm lesion in the right anterior segment of the liver remains indeterminate for solid enhancement. Review of prior imaging may be helpful to document stability.                TEJAL DEL TORO, ATTENDING RADIOLOGIST  This document has been electronically signed. May 15 2020  1:06PM                < end of copied text >                  ----  Personally reviewed:  Vital sign trends: [ X ] yes    [  ] no     [  ] n/a  Laboratory results: [X  ] yes    [  ] no     [  ] n/a  Radiology results: [X  ] yes    [  ] no     [  ] n/a  Consultant recommendations: [X  ] yes    [  ] no     [  ] n/a Patient is a 54y old  Male who presents with a chief complaint of Abdominal pain (15 May 2020 15:59)      FROM ADMISSION H+P:   HPI:  55 yo male PMHx Carcinoid tumor-s/p SBR, partial gastrectomy/duodenectomy, A-fib s/p Ablation  & 2019, GIB, HLD presented to the ED with sharp, constant, 8/10 worsening RLQ and LLQ abdominal pain radiating to the flank b/l that started around 1PM yesterday. States pain started when he ate a tuna bagel sandwich and continued to worsen. Reports going to urgent care later that day and had labs performed, which were negative, and was told to avoid solid foods and that he likely had pancreatitis. Abdominal pain continued to worsen, so pt came to ED. Admits to some nausea, but denies any recent fevers, chills, vomiting, headaches, dizziness, chest pain, shortness of breath, diarrhea, constipation, dysuria, or hematuria. Last BM yesterday morning and well formed. Denies any sick contacts. Lives at home with wife and children. Of note, pt admits being an avid runner(running ~5 miles every /working out every day) and states his HR usually runs low.    ED course:   Vitals: T 98.2, HR 54, /74, O2 100 on RA  Significant labs: T-bili 1.8  UA neg  EKG: Bradycardia, QTc 416  U/S gallbladder: Unremarkable  CT abd/pelvis: 1. Small bowel intussusception within the left pelvis, possibly reflecting source of patient's abdominal pain. No bowel obstruction.  2. Indeterminate enhancing 1.4 cm soft tissue nodule in the left abdominal mesentery. Carcinoid metastatic lesion is not excluded given patient's history. Further evaluation with follow-up Octreoscan is recommended.  In the ED patient recieved: Dilaudid .5 X2, Dilaudid .55 X1, Protonix 40 IV X1, Zofran 4 mg X1, NS 1000 ml bolus X1 (15 May 2020 09:45)      ----  INTERVAL HPI/OVERNIGHT EVENTS: Pt seen and evaluated at the bedside. No acute overnight events occurred. Tolerating clear liquid diet this morning     ----  PAST MEDICAL & SURGICAL HISTORY:  Afib  Carcinoid tumor: s/p resection in       FAMILY HISTORY:  FHx: myocardial infarction: Father- 43      ----  MEDICATIONS  (STANDING):  atorvastatin 10 milliGRAM(s) Oral at bedtime  ferrous    sulfate 325 milliGRAM(s) Oral daily  sodium chloride 0.9%. 1000 milliLiter(s) (130 mL/Hr) IV Continuous <Continuous>    MEDICATIONS  (PRN):  acetaminophen   Tablet .. 650 milliGRAM(s) Oral every 4 hours PRN Mild Pain (1 - 3)  HYDROmorphone  Injectable 0.5 milliGRAM(s) IV Push every 6 hours PRN Severe Pain (7 - 10)  ondansetron Injectable 4 milliGRAM(s) IV Push every 6 hours PRN Nausea  traMADol 50 milliGRAM(s) Oral every 6 hours PRN Moderate Pain (4 - 6)      ----  REVIEW OF SYSTEMS:  CONSTITUTIONAL: denies fever, chills, fatigue, weakness  HEENT: denies blurred vision, sore throat  SKIN: denies new lesions, rash  CARDIOVASCULAR: denies chest pain, chest pressure, palpitations  RESPIRATORY: denies shortness of breath, sputum production  GASTROINTESTINAL: admits mild abdominal pain denies nausea, vomiting, diarrhea  GENITOURINARY: denies dysuria, discharge  NEUROLOGICAL: denies numbness, headache, focal weakness  MUSCULOSKELETAL: denies new joint pain, muscle aches  HEMATOLOGIC: denies gross bleeding, bruising  ENDOCRINOLOGIC: denies sweating, cold or heat intolerance    ----  PHYSICAL EXAM:  GENERAL: patient appears well, no acute distress, appropriate, pleasant  EYES: sclera clear, no exudates  ENMT: oropharynx clear without erythema, no exudates, moist mucous membranes  NECK: supple, soft, no thyromegaly noted  LUNGS: good air entry bilaterally, clear to auscultation, symmetric breath sounds, no wheezing or rhonchi appreciated  HEART: soft S1/S2, regular rate and rhythm, no murmurs noted, no lower extremity edema  GASTROINTESTINAL: abdomen is soft, nontender, nondistended, mild ttp b/l LQ  INTEGUMENT: good skin turgor, no lesions noted  MUSCULOSKELETAL: no clubbing or cyanosis, no obvious deformity  NEUROLOGIC: awake, alert, oriented x3, good muscle tone in 4 extremities, no obvious sensory deficits  PSYCHIATRIC: mood is good, affect is congruent, linear and logical thought process  HEME/LYMPH: no palpable supraclavicular nodules, no obvious ecchymosis or petechiae     T(C): 36.7 (20 @ 04:44), Max: 37.3 (05-15-20 @ 20:31)  HR: 48 (20 @ 04:44) (46 - 52)  BP: 98/60 (20 @ 04:44) (98/60 - 135/75)  RR: 16 (20 @ 04:44) (16 - 17)  SpO2: 95% (20 @ 04:44) (95% - 99%)  Wt(kg): --    ----  I&O's Summary      LABS:                        13.7   5.97  )-----------( 157      ( 16 May 2020 06:29 )             39.0     05-15    141  |  104  |  16  ----------------------------<  104<H>  4.1   |  30  |  1.10    Ca    8.9      15 May 2020 01:27    TPro  6.9  /  Alb  3.8  /  TBili  1.8<H>  /  DBili  x   /  AST  22  /  ALT  29  /  AlkPhos  50  05-15    PT/INR - ( 15 May 2020 01:27 )   PT: 12.1 sec;   INR: 1.08 ratio         PTT - ( 15 May 2020 01:27 )  PTT:31.9 sec  Urinalysis Basic - ( 15 May 2020 04:16 )    Color: Yellow / Appearance: Clear / S.010 / pH: x  Gluc: x / Ketone: Negative  / Bili: Negative / Urobili: Negative   Blood: x / Protein: Negative / Nitrite: Negative   Leuk Esterase: Negative / RBC: x / WBC x   Sq Epi: x / Non Sq Epi: x / Bacteria: x      CAPILLARY BLOOD GLUCOSE    < from: MR Abdomen w/wo IV Cont (05.15.20 @ 12:03) >  EXAM:  MR ABDOMEN WAW                             PROCEDURE DATE:  05/15/2020          INTERPRETATION:  MR ABDOMEN WITHOUT AND WITH IV CONTRAST    CLINICAL INFORMATION: Small bowel intussusception, mesenteric mass. History of carcinoid tumor status post gastrectomy in .    TECHNIQUE: Multiplanar T1-weighted, T2-weighted, and diffusion weighted sequences were obtained of the abdomen, including dynamic post-contrast T1-weighted sequences.  3D heavily T2-weighted thin-section MRCP was also performed.    Field Strength: 1.5T    Contrast: 9 cc Gadavist.    COMPARISON: CT abdomen and pelvis from the same day.    FINDINGS:    LOWER CHEST: Clear.    LIVER: 1.1 cm lesion in the right anterior segment near the dome remains indeterminate on MRI.  BILE DUCTS: Nondilated.  GALLBLADDER: Normal.  SPLEEN: Normal.  PANCREAS: Normal.  ADRENALS: Normal.  KIDNEYS/URETERS: Symmetric nephrograms. No hydronephrosis.    VISUALIZED PORTIONS:    BOWEL: Stable postsurgical anatomy status post distal gastrectomy and gastrojejunostomy. No obstruction. Previously seen short segment intussusception in the pelvic small bowel is no longer identified. No definite lead point mass is identified. Small nonspecific nodule in the small bowel mesentery again noted measuring 1.1 cm.  PERITONEUM: No ascites.  VESSELS:  Normal caliber aorta.  RETROPERITONEUM/LYMPH NODES: No adenopathy.    ABDOMINAL WALL: Normal.  BONES: No aggressive lesion.    IMPRESSION:    Resolution of pelvic small bowel intussusception withouta definite lead point mass identified.    Small nonspecific nodule in the small bowel mesentery again noted measuring 1.1 cm. Lymph node versus metastasis remains in the differential. Consider follow-up imaging with gallium Dotatate PET/CT if there is continued concern for a somatostatin receptor bearing lesion.    1.1 cm lesion in the right anterior segment of the liver remains indeterminate for solid enhancement. Review of prior imaging may be helpful to document stability.                TEJAL DEL TORO, ATTENDING RADIOLOGIST  This document has been electronically signed. May 15 2020  1:06PM                < end of copied text >                  ----  Personally reviewed:  Vital sign trends: [ X ] yes    [  ] no     [  ] n/a  Laboratory results: [X  ] yes    [  ] no     [  ] n/a  Radiology results: [X  ] yes    [  ] no     [  ] n/a  Consultant recommendations: [X  ] yes    [  ] no     [  ] n/a

## 2020-05-16 NOTE — PROGRESS NOTE ADULT - PROBLEM SELECTOR PLAN 1
- Afebrile, no leukocytosis, Lactate wnl   - CT abd/pelvis: 1. Small bowel intussusception within the left pelvis, possibly reflecting source of patient's abdominal pain. No bowel obstruction.   - Unclear if intussusception or ischemic mesentery is source of abdominal pain  - Tylenol PRN for mild pain, Tramadol for Mod PRN for pain, Dilaudid PRN for severe pain  - Zofran PRN for nausea, ED QTc 416  - GI Dr. Sheikh wells appreciated, started clear liquid diet, advance as tolerated - Afebrile, no leukocytosis, Lactate wnl   - CT abd/pelvis: 1. Small bowel intussusception within the left pelvis, possibly reflecting source of patient's abdominal pain. No bowel obstruction.   - Unclear if intussusception or ischemic mesentery is source of abdominal pain  - Tylenol PRN for mild pain, Tramadol for Mod PRN for pain, Dilaudid PRN for severe pain  - Zofran PRN for nausea, ED QTc 416  - GI Dr. Sheikh ewlls appreciated, started clear liquid diet, advance as tolerated  - D/w Dr. Gutierrez, agrees with advancing diet. Serial abdominal exam

## 2020-05-16 NOTE — DISCHARGE NOTE PROVIDER - NSDCMRMEDTOKEN_GEN_ALL_CORE_FT
atorvastatin 10 mg oral tablet: 1 tab(s) orally once a day  ferrous sulfate 325 mg (65 mg elemental iron) oral delayed release tablet: 1 tab(s) orally once a day

## 2020-05-16 NOTE — DISCHARGE NOTE PROVIDER - HOSPITAL COURSE
53 yo male PMHx Carcinoid tumor-s/p SBR, partial gastrectomy/duodenectomy, A-fib s/p Ablation 2009 & 2019, GIB, HLD presented to the ED with abdominal pain. Admitted for Intussusception of small bowel. Pt was seen by surgery and GI whom optimized his care. His symptoms resolved and he advanced his diet well. Pt was recommended close follow up for further care with GI and primary medical provider. Pt is stable for discharge.             Time spent: 40 minutes

## 2020-05-16 NOTE — PROGRESS NOTE ADULT - ASSESSMENT
53 yo male PMHx Carcinoid tumor-s/p SBR, partial gastrectomy/duodenectomy, A-fib s/p Ablation 2009 & 2019, GIB, HLD presented to the ED with abdominal pain. Admitted for Intussusception of small bowel.

## 2020-05-16 NOTE — PROGRESS NOTE ADULT - PROBLEM SELECTOR PLAN 2
- CT abd/pelvis: Indeterminate enhancing 1.4 cm soft tissue nodule in the left abdominal mesentery. Carcinoid metastatic lesion is not excluded given patient's history  - GI input appreciated  -will need outpatient gallium Dotatate PET/CT, this test can be done at [facility]

## 2020-05-16 NOTE — DISCHARGE NOTE PROVIDER - NSDCCPCAREPLAN_GEN_ALL_CORE_FT
PRINCIPAL DISCHARGE DIAGNOSIS  Diagnosis: Abdominal pain  Assessment and Plan of Treatment: you were seen by surgery and gastroenterology, given pain medications and your symptoms resolved, please follow up with gastroenterology and your primary medical provider within 1 week of discharge      SECONDARY DISCHARGE DIAGNOSES  Diagnosis: Carcinoid tumor  Assessment and Plan of Treatment: plan as above    Diagnosis: Intussusception of small bowel  Assessment and Plan of Treatment: resolved, plan as above

## 2020-05-16 NOTE — PROGRESS NOTE ADULT - PROBLEM SELECTOR PLAN 6
- Incidental 9 mm hypodense lesion within the right hepatic lobe too small to characterize  -will need outpatient gallium Dotatate PET/CT this test can be done at [facility]  - GI Dr. Ponce, reccs appreciated

## 2020-06-03 ENCOUNTER — APPOINTMENT (OUTPATIENT)
Dept: NUCLEAR MEDICINE | Facility: CLINIC | Age: 55
End: 2020-06-03
Payer: COMMERCIAL

## 2020-06-03 ENCOUNTER — RESULT REVIEW (OUTPATIENT)
Age: 55
End: 2020-06-03

## 2020-06-03 ENCOUNTER — OUTPATIENT (OUTPATIENT)
Dept: OUTPATIENT SERVICES | Facility: HOSPITAL | Age: 55
LOS: 1 days | End: 2020-06-03
Payer: COMMERCIAL

## 2020-06-03 DIAGNOSIS — D3A.00 BENIGN CARCINOID TUMOR OF UNSPECIFIED SITE: Chronic | ICD-10-CM

## 2020-06-03 DIAGNOSIS — Z00.8 ENCOUNTER FOR OTHER GENERAL EXAMINATION: ICD-10-CM

## 2020-06-03 PROCEDURE — 78815 PET IMAGE W/CT SKULL-THIGH: CPT | Mod: 26,PI

## 2020-06-03 PROCEDURE — 78815 PET IMAGE W/CT SKULL-THIGH: CPT

## 2020-06-03 PROCEDURE — A9587: CPT

## 2020-06-04 ENCOUNTER — TRANSCRIPTION ENCOUNTER (OUTPATIENT)
Age: 55
End: 2020-06-04

## 2021-02-25 ENCOUNTER — NON-APPOINTMENT (OUTPATIENT)
Age: 56
End: 2021-02-25

## 2021-02-25 ENCOUNTER — APPOINTMENT (OUTPATIENT)
Dept: ORTHOPEDIC SURGERY | Facility: CLINIC | Age: 56
End: 2021-02-25
Payer: COMMERCIAL

## 2021-02-25 VITALS
HEIGHT: 77 IN | DIASTOLIC BLOOD PRESSURE: 80 MMHG | WEIGHT: 202 LBS | BODY MASS INDEX: 23.85 KG/M2 | HEART RATE: 55 BPM | SYSTOLIC BLOOD PRESSURE: 117 MMHG

## 2021-02-25 DIAGNOSIS — M54.2 CERVICALGIA: ICD-10-CM

## 2021-02-25 DIAGNOSIS — Z86.39 PERSONAL HISTORY OF OTHER ENDOCRINE, NUTRITIONAL AND METABOLIC DISEASE: ICD-10-CM

## 2021-02-25 DIAGNOSIS — M54.9 DORSALGIA, UNSPECIFIED: ICD-10-CM

## 2021-02-25 DIAGNOSIS — M54.5 LOW BACK PAIN: ICD-10-CM

## 2021-02-25 DIAGNOSIS — G89.29 DORSALGIA, UNSPECIFIED: ICD-10-CM

## 2021-02-25 DIAGNOSIS — G89.29 LOW BACK PAIN: ICD-10-CM

## 2021-02-25 PROCEDURE — 99204 OFFICE O/P NEW MOD 45 MIN: CPT

## 2021-02-25 PROCEDURE — 72040 X-RAY EXAM NECK SPINE 2-3 VW: CPT

## 2021-02-25 PROCEDURE — 99072 ADDL SUPL MATRL&STAF TM PHE: CPT

## 2021-02-26 PROBLEM — Z86.39 HISTORY OF HYPERLIPIDEMIA: Status: RESOLVED | Noted: 2021-02-26 | Resolved: 2021-02-26

## 2021-02-26 PROBLEM — M54.5 CHRONIC BILATERAL LOW BACK PAIN, UNSPECIFIED WHETHER SCIATICA PRESENT: Status: ACTIVE | Noted: 2021-02-26

## 2021-02-26 NOTE — HISTORY OF PRESENT ILLNESS
[de-identified] : This 55-year-old male has a history of intermittent lower back symptoms for many years.  He also has a 12-year history of intermittent neck symptoms that can occur roughly once a year and normally last for a week or so without associated arm pain or neurologic symptoms.  The treatment is normally just rest and ice.  2 weeks ago he had the onset of neck and right-sided upper back pain.  He has not had arm pain or neurologic symptoms.  There have been no changes in his gait or balance.  Treatment to date has been rest ice and Tylenol.  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  He has not taken nonsteroidal anti-inflammatory medications since then but he has not had any GI symptoms.  He has had 2 bouts of atrial fibrillation in the past in 2009 and 2019 treated with cardioversion.  He is sizes regularly running twice a week and doing resistance exercises. [Pain Location] : pain [Worsening] : worsening [6] : a maximum pain level of 6/10

## 2021-02-26 NOTE — PHYSICAL EXAM
[de-identified] : He is fully alert and oriented with a normal mood and affect.  He is in no acute distress as I take the history.  He is trim and fit.  He ambulates with a normal gait including tiptoe and heel walking.  There is no evidence of unsteadiness or spasticity.  There are no cutaneous abnormalities or palpable bony defects of the spine.  There is no evidence of shortness of breath or respiratory distress.  There is no paravertebral muscle spasm, sciatic notch tenderness or trochanteric tenderness.  His lower extremity neurological examination revealed 1-2+ symmetrical reflexes with reinforcement.  Motor power is normal manual testing in all lower extremity groups and sensation is normal to light touch in all dermatomes.  Straight leg raising is negative to 90 degrees in the sitting position.  There is some mild calf atrophy where he had had prior surgery for a peroneal tendon problem.  His hips and knees have a full and painless range of motion with normal stability.  Vascular examination shows no evidence of varicosities and there is no lymphedema.  There are no cutaneous abnormalities of the upper or lower extremities.  His upper extremity neurological examination revealed 1+ symmetrical reflexes with reinforcement.  Motor power is normal in all upper extremity groups and sensation is normal to light touch in all dermatomes.  He has a negative Nikki bilaterally.  Shoulder range of motion is full, painless and symmetrical.  Range of motion of the cervical spine shows flexion with the chin reaching to within 2 fingerbreadths of the chest, extension of 70 degrees with discomfort, rotation of 70 degrees bilaterally with discomfort and tilt of 30 degrees bilaterally with discomfort. [de-identified] : AP and lateral x-rays of the cervical spine reveal normal sagittal alignment.  There is a mild scoliosis.  There is moderate disc space narrowing at C5-6 and C6-7.  There are no destructive changes.

## 2021-02-26 NOTE — DISCUSSION/SUMMARY
[Medication Risks Reviewed] : Medication risks reviewed [de-identified] : He has not taken nonsteroidal anti-inflammatory medicine since his GI bleed in 2008 at which time the carcinoid tumor of the duodenum was discovered.  He has been started on omeprazole 20 mg once a day and after 3 doses will take a Medrol Dosepak.  He will rest and use heat.  He will call if there are problems with the medication and I will see him for follow-up on a as needed basis in 2 to 3 weeks.

## 2021-03-03 ENCOUNTER — NON-APPOINTMENT (OUTPATIENT)
Age: 56
End: 2021-03-03

## 2021-03-09 ENCOUNTER — NON-APPOINTMENT (OUTPATIENT)
Age: 56
End: 2021-03-09

## 2021-03-09 ENCOUNTER — APPOINTMENT (OUTPATIENT)
Dept: CARDIOLOGY | Facility: CLINIC | Age: 56
End: 2021-03-09
Payer: COMMERCIAL

## 2021-03-09 VITALS
HEART RATE: 45 BPM | WEIGHT: 195 LBS | SYSTOLIC BLOOD PRESSURE: 118 MMHG | HEIGHT: 77 IN | BODY MASS INDEX: 23.02 KG/M2 | OXYGEN SATURATION: 99 % | DIASTOLIC BLOOD PRESSURE: 73 MMHG

## 2021-03-09 PROCEDURE — 99072 ADDL SUPL MATRL&STAF TM PHE: CPT

## 2021-03-09 PROCEDURE — 99214 OFFICE O/P EST MOD 30 MIN: CPT

## 2021-03-09 PROCEDURE — 93000 ELECTROCARDIOGRAM COMPLETE: CPT

## 2021-03-09 RX ORDER — METHYLPREDNISOLONE 4 MG/1
4 TABLET ORAL
Qty: 21 | Refills: 0 | Status: DISCONTINUED | COMMUNITY
Start: 2021-02-25 | End: 2021-03-09

## 2021-03-09 RX ORDER — OMEPRAZOLE 20 MG/1
20 CAPSULE, DELAYED RELEASE ORAL DAILY
Qty: 30 | Refills: 0 | Status: DISCONTINUED | COMMUNITY
Start: 2021-02-25 | End: 2021-03-09

## 2021-05-26 ENCOUNTER — APPOINTMENT (OUTPATIENT)
Dept: OTOLARYNGOLOGY | Facility: CLINIC | Age: 56
End: 2021-05-26
Payer: COMMERCIAL

## 2021-05-26 VITALS
TEMPERATURE: 96.3 F | DIASTOLIC BLOOD PRESSURE: 79 MMHG | HEIGHT: 77 IN | BODY MASS INDEX: 24.21 KG/M2 | HEART RATE: 46 BPM | WEIGHT: 205 LBS | SYSTOLIC BLOOD PRESSURE: 128 MMHG

## 2021-05-26 PROCEDURE — 92557 COMPREHENSIVE HEARING TEST: CPT

## 2021-05-26 PROCEDURE — 99203 OFFICE O/P NEW LOW 30 MIN: CPT

## 2021-05-26 PROCEDURE — 99072 ADDL SUPL MATRL&STAF TM PHE: CPT

## 2021-05-26 PROCEDURE — 92550 TYMPANOMETRY & REFLEX THRESH: CPT

## 2021-05-26 NOTE — REVIEW OF SYSTEMS
[Dizziness] : dizziness [Vertigo] : vertigo [Negative] : Heme/Lymph [Patient Intake Form Reviewed] : Patient intake form was reviewed

## 2021-05-26 NOTE — HISTORY OF PRESENT ILLNESS
[de-identified] : DIZZINESS SINCE YESTERDAY IMPROVING OVER TIME/ NOT TOTALLY POSITIONAL\par MEDICAL HX REVIEWED\par DEODUNAL BAL CARCINOMA \par PAT

## 2021-05-26 NOTE — ASSESSMENT
[FreeTextEntry1] : DIZZINESS/ NOT VERY TYPICAL FOR OTOGENIC ORIGINE\par  REVIEWED MILD SYMETRICAL SLOPING SNHL\par VNG\par NEUROLOGIC EVALUATION ADVISED\par F/U AFTER ABOVE

## 2021-05-26 NOTE — PHYSICAL EXAM
[Normal] : mucosa is normal [Midline] : trachea located in midline position [de-identified] : SOME UNSTEADINESS NOTED IN [place]BERG

## 2021-05-27 ENCOUNTER — APPOINTMENT (OUTPATIENT)
Dept: NEUROLOGY | Facility: CLINIC | Age: 56
End: 2021-05-27
Payer: COMMERCIAL

## 2021-05-27 VITALS
DIASTOLIC BLOOD PRESSURE: 78 MMHG | HEIGHT: 77 IN | HEART RATE: 52 BPM | SYSTOLIC BLOOD PRESSURE: 135 MMHG | BODY MASS INDEX: 24.21 KG/M2 | WEIGHT: 205 LBS

## 2021-05-27 DIAGNOSIS — H81.10 BENIGN PAROXYSMAL VERTIGO, UNSPECIFIED EAR: ICD-10-CM

## 2021-05-27 DIAGNOSIS — Z80.9 FAMILY HISTORY OF MALIGNANT NEOPLASM, UNSPECIFIED: ICD-10-CM

## 2021-05-27 DIAGNOSIS — R26.89 OTHER ABNORMALITIES OF GAIT AND MOBILITY: ICD-10-CM

## 2021-05-27 DIAGNOSIS — Z86.79 PERSONAL HISTORY OF OTHER DISEASES OF THE CIRCULATORY SYSTEM: ICD-10-CM

## 2021-05-27 DIAGNOSIS — Z78.9 OTHER SPECIFIED HEALTH STATUS: ICD-10-CM

## 2021-05-27 DIAGNOSIS — R42 DIZZINESS AND GIDDINESS: ICD-10-CM

## 2021-05-27 PROCEDURE — 99072 ADDL SUPL MATRL&STAF TM PHE: CPT

## 2021-05-27 PROCEDURE — 99205 OFFICE O/P NEW HI 60 MIN: CPT

## 2021-05-27 RX ORDER — IRON/IRON ASP GLY/FA/MV-MIN 38 125-25-1MG
TABLET ORAL
Refills: 0 | Status: ACTIVE | COMMUNITY

## 2021-05-27 NOTE — PHYSICAL EXAM
[General Appearance - Alert] : alert [Oriented To Time, Place, And Person] : oriented to person, place, and time [Person] : oriented to person [Place] : oriented to place [Time] : oriented to time [Cranial Nerves Optic (II)] : visual acuity intact bilaterally,  visual fields full to confrontation, pupils equal round and reactive to light [Cranial Nerves Oculomotor (III)] : extraocular motion intact [Cranial Nerves Trigeminal (V)] : facial sensation intact symmetrically [Cranial Nerves Facial (VII)] : face symmetrical [Cranial Nerves Vestibulocochlear (VIII)] : hearing was intact bilaterally [Cranial Nerves Glossopharyngeal (IX)] : tongue and palate midline [Cranial Nerves Accessory (XI - Cranial And Spinal)] : head turning and shoulder shrug symmetric [Cranial Nerves Hypoglossal (XII)] : there was no tongue deviation with protrusion [Motor Tone] : muscle tone was normal in all four extremities [Motor Strength] : muscle strength was normal in all four extremities [Involuntary Movements] : no involuntary movements were seen [No Muscle Atrophy] : normal bulk in all four extremities [Motor Handedness Right-Handed] : the patient is right hand dominant [Paresis Pronator Drift Right-Sided] : no pronator drift on the right [Paresis Pronator Drift Left-Sided] : no pronator drift on the left [Sensation Tactile Decrease] : light touch was intact [Sensation Pain / Temperature Decrease] : pain and temperature was intact [Romberg's Sign] : a positive Romberg's sign was present [Limited Balance] : the patient's balance was impaired [Past-pointing] : there was no past-pointing [Tremor] : no tremor present [Coordination - Dysmetria Impaired Finger-to-Nose Bilateral] : not present [2+] : Brachioradialis left 2+ [1+] : Patella left 1+ [0] : Ankle jerk left 0 [FreeTextEntry5] : no nystagmus note on exam  [FreeTextEntry7] : pateint with anterior/ posterior sway [FreeTextEntry8] : tandem gait was impaired on testing  [PERRL With Normal Accommodation] : pupils were equal in size, round, reactive to light, with normal accommodation [Extraocular Movements] : extraocular movements were intact [Neck Appearance] : the appearance of the neck was normal [] : no rash [Skin Lesions] : no skin lesions

## 2021-05-27 NOTE — REVIEW OF SYSTEMS
[Fever] : no fever [Chills] : no chills [Feeling Tired] : feeling tired [As Noted in HPI] : as noted in HPI [Hand Weakness] : no hand weakness [Leg Weakness] : no leg weakness [Tingling] : no tingling [Abnormal Sensation] : no abnormal sensation [Dizziness] : no dizziness [Lightheadedness] : no lightheadedness [Migraine Headache] : no migraine headache [Tension Headache] : no tension-type headache [Difficulty Walking] : no difficulty walking [Negative] : Heme/Lymph

## 2021-05-27 NOTE — DISCUSSION/SUMMARY
[FreeTextEntry1] : 1. Patient with acute onset of vertigo and dizziness 2 days ago while on computer and now resolved and no prior episodes in past .\par Exam shows difficulty with tandem gait but no other neurologic deficits \par \par DDX: include BPPV vs less likely inner ear structural abnormality \par \par 2. Obtain MRI of brain with IAC thin cuts \par \par 3. TCD and carotid dopplers to assess blood flow to brain as patient has ischemic risk factors \par \par 4. May use meclizine as needed for acute attacks \par \par 5. Follow up in 6-8 weeks after the above completed and continue hydration, avoid rapid and sudden movements and avoid strenuous activities.

## 2021-06-04 ENCOUNTER — APPOINTMENT (OUTPATIENT)
Dept: OTOLARYNGOLOGY | Facility: CLINIC | Age: 56
End: 2021-06-04
Payer: COMMERCIAL

## 2021-06-04 PROCEDURE — 92540 BASIC VESTIBULAR EVALUATION: CPT

## 2021-06-04 PROCEDURE — 99072 ADDL SUPL MATRL&STAF TM PHE: CPT

## 2021-06-04 PROCEDURE — 92537 CALORIC VSTBLR TEST W/REC: CPT

## 2021-06-11 ENCOUNTER — APPOINTMENT (OUTPATIENT)
Dept: NEUROLOGY | Facility: CLINIC | Age: 56
End: 2021-06-11
Payer: COMMERCIAL

## 2021-06-11 PROCEDURE — 93880 EXTRACRANIAL BILAT STUDY: CPT

## 2021-06-11 PROCEDURE — 93888 INTRACRANIAL LIMITED STUDY: CPT

## 2021-06-11 PROCEDURE — 99072 ADDL SUPL MATRL&STAF TM PHE: CPT

## 2021-06-12 ENCOUNTER — APPOINTMENT (OUTPATIENT)
Dept: MRI IMAGING | Facility: CLINIC | Age: 56
End: 2021-06-12
Payer: COMMERCIAL

## 2021-06-12 ENCOUNTER — OUTPATIENT (OUTPATIENT)
Dept: OUTPATIENT SERVICES | Facility: HOSPITAL | Age: 56
LOS: 1 days | End: 2021-06-12
Payer: COMMERCIAL

## 2021-06-12 DIAGNOSIS — Z00.8 ENCOUNTER FOR OTHER GENERAL EXAMINATION: ICD-10-CM

## 2021-06-12 DIAGNOSIS — D3A.00 BENIGN CARCINOID TUMOR OF UNSPECIFIED SITE: Chronic | ICD-10-CM

## 2021-06-12 DIAGNOSIS — R42 DIZZINESS AND GIDDINESS: ICD-10-CM

## 2021-06-12 PROCEDURE — 70553 MRI BRAIN STEM W/O & W/DYE: CPT | Mod: 26

## 2021-06-12 PROCEDURE — 70553 MRI BRAIN STEM W/O & W/DYE: CPT

## 2021-06-12 PROCEDURE — A9585: CPT

## 2021-06-14 ENCOUNTER — NON-APPOINTMENT (OUTPATIENT)
Age: 56
End: 2021-06-14

## 2021-06-15 ENCOUNTER — NON-APPOINTMENT (OUTPATIENT)
Age: 56
End: 2021-06-15

## 2021-07-22 ENCOUNTER — APPOINTMENT (OUTPATIENT)
Dept: NEUROLOGY | Facility: CLINIC | Age: 56
End: 2021-07-22

## 2022-03-23 ENCOUNTER — NON-APPOINTMENT (OUTPATIENT)
Age: 57
End: 2022-03-23

## 2022-04-08 ENCOUNTER — NON-APPOINTMENT (OUTPATIENT)
Age: 57
End: 2022-04-08

## 2022-04-14 ENCOUNTER — APPOINTMENT (OUTPATIENT)
Dept: ORTHOPEDIC SURGERY | Facility: CLINIC | Age: 57
End: 2022-04-14
Payer: COMMERCIAL

## 2022-04-14 ENCOUNTER — NON-APPOINTMENT (OUTPATIENT)
Age: 57
End: 2022-04-14

## 2022-04-14 VITALS
HEART RATE: 55 BPM | BODY MASS INDEX: 23.62 KG/M2 | HEIGHT: 77 IN | DIASTOLIC BLOOD PRESSURE: 82 MMHG | SYSTOLIC BLOOD PRESSURE: 120 MMHG | WEIGHT: 200 LBS

## 2022-04-14 DIAGNOSIS — M48.07 SPINAL STENOSIS, LUMBOSACRAL REGION: ICD-10-CM

## 2022-04-14 DIAGNOSIS — M51.36 OTHER INTERVERTEBRAL DISC DEGENERATION, LUMBAR REGION: ICD-10-CM

## 2022-04-14 PROCEDURE — 99204 OFFICE O/P NEW MOD 45 MIN: CPT

## 2022-05-01 NOTE — ED PROVIDER NOTE - DATE/TIME FOR CONVERSATION WITH OTHER:
Patient:   Tamra Brock             MRN: [id number]            FIN: [id number]               Age:   49 years     Sex:  Female     :  1968   Associated Diagnoses:   None   Author:   Dakota Addison DPM      Operative Note   Operative Information   Date/ Time:  2018 11:47:00.     Procedures Performed: Repair of left Achilles tendon.     Preoperative Diagnosis: Complete rupture left Achilles tendon.     Postoperative Diagnosis: Same.     Surgeon: Dakota Addison DPM.     Anesthesia: Gen..     Speciman Removed: None.     Description of Procedure/Findings/    Complications: Procedure #1: Repair Achilles tendon left.    Attention was directed to posterior aspect of the patient's left lower extremity where a 8 cm incision was created.  This incision was deepened through the layers of subcutaneous tissue.  At the level of the deep fascia the ruptured aspect of the tendon was identified.  A complete rupture was noted.  Approximately 5 mm of the tendon was resected in order to remove the frayed/necrotic tendon.  A 5.5 mm Arthrex anchor was then inserted into the calcaneus.  The tendon was reapproximated to the calcaneus utilizing the spider wire within the anchor.  Secondary repair was created utilizing most distal aspect of the Achilles.  The area was then thoroughly irrigated.  PRP was applied to the area.  The area was closed in layered closure.  A plantar flexed posterior splint was then applied to the area.  .     Esimated blood loss: No blood loss.     Notes: Pneumatic thigh tourniquet was utilized for hemostasis.     
29-Mar-2019 16:25

## 2022-05-04 ENCOUNTER — NON-APPOINTMENT (OUTPATIENT)
Age: 57
End: 2022-05-04

## 2022-05-04 ENCOUNTER — APPOINTMENT (OUTPATIENT)
Dept: CARDIOLOGY | Facility: CLINIC | Age: 57
End: 2022-05-04
Payer: COMMERCIAL

## 2022-05-04 VITALS
BODY MASS INDEX: 23.62 KG/M2 | SYSTOLIC BLOOD PRESSURE: 114 MMHG | HEIGHT: 77 IN | HEART RATE: 54 BPM | OXYGEN SATURATION: 100 % | DIASTOLIC BLOOD PRESSURE: 75 MMHG | WEIGHT: 200 LBS

## 2022-05-04 PROCEDURE — 99214 OFFICE O/P EST MOD 30 MIN: CPT

## 2022-05-04 PROCEDURE — 93000 ELECTROCARDIOGRAM COMPLETE: CPT

## 2022-05-04 RX ORDER — MECLIZINE HYDROCHLORIDE 12.5 MG/1
12.5 TABLET ORAL 3 TIMES DAILY
Qty: 21 | Refills: 0 | Status: DISCONTINUED | COMMUNITY
Start: 2021-05-27 | End: 2022-05-04

## 2023-05-05 ENCOUNTER — NON-APPOINTMENT (OUTPATIENT)
Age: 58
End: 2023-05-05

## 2023-05-24 ENCOUNTER — NON-APPOINTMENT (OUTPATIENT)
Age: 58
End: 2023-05-24

## 2023-06-02 ENCOUNTER — APPOINTMENT (OUTPATIENT)
Dept: ORTHOPEDIC SURGERY | Facility: CLINIC | Age: 58
End: 2023-06-02
Payer: COMMERCIAL

## 2023-06-02 VITALS — BODY MASS INDEX: 23.62 KG/M2 | HEIGHT: 77 IN | WEIGHT: 200 LBS

## 2023-06-02 DIAGNOSIS — M25.571 PAIN IN RIGHT ANKLE AND JOINTS OF RIGHT FOOT: ICD-10-CM

## 2023-06-02 DIAGNOSIS — M25.572 PAIN IN LEFT ANKLE AND JOINTS OF LEFT FOOT: ICD-10-CM

## 2023-06-02 DIAGNOSIS — M25.579 PAIN IN UNSPECIFIED ANKLE AND JOINTS OF UNSPECIFIED FOOT: ICD-10-CM

## 2023-06-02 DIAGNOSIS — R93.7 ABNORMAL FINDINGS ON DIAGNOSTIC IMAGING OF OTHER PARTS OF MUSCULOSKELETAL SYSTEM: ICD-10-CM

## 2023-06-02 PROCEDURE — 99214 OFFICE O/P EST MOD 30 MIN: CPT

## 2023-06-02 PROCEDURE — 73610 X-RAY EXAM OF ANKLE: CPT | Mod: LT

## 2023-06-13 PROBLEM — R93.7 BONE MARROW EDEMA: Status: ACTIVE | Noted: 2023-06-13

## 2023-06-23 ENCOUNTER — APPOINTMENT (OUTPATIENT)
Dept: MRI IMAGING | Facility: CLINIC | Age: 58
End: 2023-06-23
Payer: COMMERCIAL

## 2023-06-23 PROCEDURE — 73721 MRI JNT OF LWR EXTRE W/O DYE: CPT | Mod: LT

## 2023-07-12 ENCOUNTER — APPOINTMENT (OUTPATIENT)
Dept: CARDIOLOGY | Facility: CLINIC | Age: 58
End: 2023-07-12
Payer: COMMERCIAL

## 2023-07-12 ENCOUNTER — NON-APPOINTMENT (OUTPATIENT)
Age: 58
End: 2023-07-12

## 2023-07-12 VITALS
HEART RATE: 48 BPM | SYSTOLIC BLOOD PRESSURE: 117 MMHG | HEIGHT: 77 IN | DIASTOLIC BLOOD PRESSURE: 77 MMHG | OXYGEN SATURATION: 100 %

## 2023-07-12 PROCEDURE — 93000 ELECTROCARDIOGRAM COMPLETE: CPT

## 2023-07-12 PROCEDURE — 99214 OFFICE O/P EST MOD 30 MIN: CPT

## 2023-07-13 ENCOUNTER — APPOINTMENT (OUTPATIENT)
Dept: ORTHOPEDIC SURGERY | Facility: CLINIC | Age: 58
End: 2023-07-13
Payer: COMMERCIAL

## 2023-07-13 DIAGNOSIS — M21.172 VARUS DEFORMITY, NOT ELSEWHERE CLASSIFIED, LEFT ANKLE: ICD-10-CM

## 2023-07-13 PROCEDURE — 99213 OFFICE O/P EST LOW 20 MIN: CPT

## 2023-08-28 ENCOUNTER — APPOINTMENT (OUTPATIENT)
Dept: ORTHOPEDIC SURGERY | Facility: CLINIC | Age: 58
End: 2023-08-28
Payer: COMMERCIAL

## 2023-08-28 ENCOUNTER — NON-APPOINTMENT (OUTPATIENT)
Age: 58
End: 2023-08-28

## 2023-08-28 VITALS
BODY MASS INDEX: 23.02 KG/M2 | HEIGHT: 77 IN | WEIGHT: 195 LBS | SYSTOLIC BLOOD PRESSURE: 114 MMHG | HEART RATE: 54 BPM | DIASTOLIC BLOOD PRESSURE: 70 MMHG

## 2023-08-28 DIAGNOSIS — M21.6X2 OTHER ACQUIRED DEFORMITIES OF LEFT FOOT: ICD-10-CM

## 2023-08-28 DIAGNOSIS — M67.88 OTHER SPECIFIED DISORDERS OF SYNOVIUM AND TENDON, OTHER SITE: ICD-10-CM

## 2023-08-28 DIAGNOSIS — Z98.890 OTHER SPECIFIED POSTPROCEDURAL STATES: ICD-10-CM

## 2023-08-28 DIAGNOSIS — S86.312D STRAIN OF MUSCLE(S) AND TENDON(S) OF PERONEAL MUSCLE GROUP AT LOWER LEG LEVEL, LEFT LEG, SUBSEQUENT ENCOUNTER: ICD-10-CM

## 2023-08-28 DIAGNOSIS — M94.279 CHONDROMALACIA, UNSPECIFIED ANKLE AND JOINTS OF FOOT: ICD-10-CM

## 2023-08-28 PROCEDURE — 99213 OFFICE O/P EST LOW 20 MIN: CPT

## 2023-09-13 PROBLEM — S86.312D PERONEAL TENDON TEAR, LEFT, SUBSEQUENT ENCOUNTER: Status: ACTIVE | Noted: 2023-07-13

## 2023-09-13 PROBLEM — M94.279 CHONDROMALACIA OF ANKLE OR JOINT OF FOOT: Status: ACTIVE | Noted: 2023-06-13

## 2023-09-13 PROBLEM — M67.88 LEFT PERONEAL TENDINOSIS: Status: ACTIVE | Noted: 2023-06-13

## 2023-09-13 PROBLEM — M21.6X2 GASTROCNEMIUS EQUINUS OF LEFT LOWER EXTREMITY: Status: ACTIVE | Noted: 2023-06-13

## 2023-09-13 PROBLEM — Z98.890 HISTORY OF ANKLE SURGERY: Status: ACTIVE | Noted: 2023-06-13

## 2023-09-22 ENCOUNTER — NON-APPOINTMENT (OUTPATIENT)
Age: 58
End: 2023-09-22

## 2023-09-22 ENCOUNTER — EMERGENCY (EMERGENCY)
Facility: HOSPITAL | Age: 58
LOS: 1 days | Discharge: HOME | End: 2023-09-22
Attending: STUDENT IN AN ORGANIZED HEALTH CARE EDUCATION/TRAINING PROGRAM | Admitting: STUDENT IN AN ORGANIZED HEALTH CARE EDUCATION/TRAINING PROGRAM
Payer: COMMERCIAL

## 2023-09-22 ENCOUNTER — APPOINTMENT (OUTPATIENT)
Dept: CARDIOLOGY | Facility: CLINIC | Age: 58
End: 2023-09-22
Payer: COMMERCIAL

## 2023-09-22 VITALS
OXYGEN SATURATION: 97 % | BODY MASS INDEX: 23.26 KG/M2 | WEIGHT: 197 LBS | SYSTOLIC BLOOD PRESSURE: 136 MMHG | HEIGHT: 77 IN | HEART RATE: 66 BPM | DIASTOLIC BLOOD PRESSURE: 75 MMHG

## 2023-09-22 VITALS
SYSTOLIC BLOOD PRESSURE: 128 MMHG | TEMPERATURE: 98 F | OXYGEN SATURATION: 99 % | WEIGHT: 195.11 LBS | HEART RATE: 74 BPM | RESPIRATION RATE: 18 BRPM | HEIGHT: 77 IN | DIASTOLIC BLOOD PRESSURE: 68 MMHG

## 2023-09-22 VITALS
OXYGEN SATURATION: 99 % | DIASTOLIC BLOOD PRESSURE: 79 MMHG | SYSTOLIC BLOOD PRESSURE: 112 MMHG | RESPIRATION RATE: 17 BRPM | HEART RATE: 52 BPM

## 2023-09-22 DIAGNOSIS — D3A.00 BENIGN CARCINOID TUMOR OF UNSPECIFIED SITE: Chronic | ICD-10-CM

## 2023-09-22 LAB
ALBUMIN SERPL ELPH-MCNC: 3.7 G/DL — SIGNIFICANT CHANGE UP (ref 3.3–5)
ALP SERPL-CCNC: 61 U/L — SIGNIFICANT CHANGE UP (ref 40–120)
ALT FLD-CCNC: 29 U/L — SIGNIFICANT CHANGE UP (ref 12–78)
ANION GAP SERPL CALC-SCNC: 4 MMOL/L — LOW (ref 5–17)
APTT BLD: 33.3 SEC — SIGNIFICANT CHANGE UP (ref 24.5–35.6)
AST SERPL-CCNC: 22 U/L — SIGNIFICANT CHANGE UP (ref 15–37)
BASOPHILS # BLD AUTO: 0.07 K/UL — SIGNIFICANT CHANGE UP (ref 0–0.2)
BASOPHILS NFR BLD AUTO: 1.2 % — SIGNIFICANT CHANGE UP (ref 0–2)
BILIRUB SERPL-MCNC: 1 MG/DL — SIGNIFICANT CHANGE UP (ref 0.2–1.2)
BUN SERPL-MCNC: 22 MG/DL — SIGNIFICANT CHANGE UP (ref 7–23)
CALCIUM SERPL-MCNC: 9 MG/DL — SIGNIFICANT CHANGE UP (ref 8.5–10.1)
CHLORIDE SERPL-SCNC: 109 MMOL/L — HIGH (ref 96–108)
CO2 SERPL-SCNC: 29 MMOL/L — SIGNIFICANT CHANGE UP (ref 22–31)
CREAT SERPL-MCNC: 1.2 MG/DL — SIGNIFICANT CHANGE UP (ref 0.5–1.3)
EGFR: 70 ML/MIN/1.73M2 — SIGNIFICANT CHANGE UP
EOSINOPHIL # BLD AUTO: 0.13 K/UL — SIGNIFICANT CHANGE UP (ref 0–0.5)
EOSINOPHIL NFR BLD AUTO: 2.2 % — SIGNIFICANT CHANGE UP (ref 0–6)
GLUCOSE SERPL-MCNC: 113 MG/DL — HIGH (ref 70–99)
HCT VFR BLD CALC: 46.3 % — SIGNIFICANT CHANGE UP (ref 39–50)
HGB BLD-MCNC: 16.2 G/DL — SIGNIFICANT CHANGE UP (ref 13–17)
IMM GRANULOCYTES NFR BLD AUTO: 0.3 % — SIGNIFICANT CHANGE UP (ref 0–0.9)
INR BLD: 0.87 RATIO — SIGNIFICANT CHANGE UP (ref 0.85–1.18)
LYMPHOCYTES # BLD AUTO: 1.45 K/UL — SIGNIFICANT CHANGE UP (ref 1–3.3)
LYMPHOCYTES # BLD AUTO: 25 % — SIGNIFICANT CHANGE UP (ref 13–44)
MAGNESIUM SERPL-MCNC: 2.1 MG/DL — SIGNIFICANT CHANGE UP (ref 1.6–2.6)
MCHC RBC-ENTMCNC: 31.8 PG — SIGNIFICANT CHANGE UP (ref 27–34)
MCHC RBC-ENTMCNC: 35 GM/DL — SIGNIFICANT CHANGE UP (ref 32–36)
MCV RBC AUTO: 90.8 FL — SIGNIFICANT CHANGE UP (ref 80–100)
MONOCYTES # BLD AUTO: 0.65 K/UL — SIGNIFICANT CHANGE UP (ref 0–0.9)
MONOCYTES NFR BLD AUTO: 11.2 % — SIGNIFICANT CHANGE UP (ref 2–14)
NEUTROPHILS # BLD AUTO: 3.49 K/UL — SIGNIFICANT CHANGE UP (ref 1.8–7.4)
NEUTROPHILS NFR BLD AUTO: 60.1 % — SIGNIFICANT CHANGE UP (ref 43–77)
NRBC # BLD: 0 /100 WBCS — SIGNIFICANT CHANGE UP (ref 0–0)
PLATELET # BLD AUTO: 230 K/UL — SIGNIFICANT CHANGE UP (ref 150–400)
POTASSIUM SERPL-MCNC: 4.1 MMOL/L — SIGNIFICANT CHANGE UP (ref 3.5–5.3)
POTASSIUM SERPL-SCNC: 4.1 MMOL/L — SIGNIFICANT CHANGE UP (ref 3.5–5.3)
PROT SERPL-MCNC: 6.9 G/DL — SIGNIFICANT CHANGE UP (ref 6–8.3)
PROTHROM AB SERPL-ACNC: 10.2 SEC — SIGNIFICANT CHANGE UP (ref 9.5–13)
RBC # BLD: 5.1 M/UL — SIGNIFICANT CHANGE UP (ref 4.2–5.8)
RBC # FLD: 12.5 % — SIGNIFICANT CHANGE UP (ref 10.3–14.5)
SODIUM SERPL-SCNC: 142 MMOL/L — SIGNIFICANT CHANGE UP (ref 135–145)
TSH SERPL-MCNC: 1.12 UIU/ML — SIGNIFICANT CHANGE UP (ref 0.36–3.74)
WBC # BLD: 5.81 K/UL — SIGNIFICANT CHANGE UP (ref 3.8–10.5)
WBC # FLD AUTO: 5.81 K/UL — SIGNIFICANT CHANGE UP (ref 3.8–10.5)

## 2023-09-22 PROCEDURE — 99285 EMERGENCY DEPT VISIT HI MDM: CPT | Mod: 25

## 2023-09-22 PROCEDURE — 85025 COMPLETE CBC W/AUTO DIFF WBC: CPT

## 2023-09-22 PROCEDURE — 92960 CARDIOVERSION ELECTRIC EXT: CPT

## 2023-09-22 PROCEDURE — 99291 CRITICAL CARE FIRST HOUR: CPT | Mod: 25

## 2023-09-22 PROCEDURE — 93000 ELECTROCARDIOGRAM COMPLETE: CPT | Mod: 59

## 2023-09-22 PROCEDURE — 99291 CRITICAL CARE FIRST HOUR: CPT

## 2023-09-22 PROCEDURE — 96374 THER/PROPH/DIAG INJ IV PUSH: CPT | Mod: XU

## 2023-09-22 PROCEDURE — 85610 PROTHROMBIN TIME: CPT

## 2023-09-22 PROCEDURE — 93010 ELECTROCARDIOGRAM REPORT: CPT | Mod: 76,59

## 2023-09-22 PROCEDURE — 96375 TX/PRO/DX INJ NEW DRUG ADDON: CPT | Mod: XU

## 2023-09-22 PROCEDURE — 93005 ELECTROCARDIOGRAM TRACING: CPT

## 2023-09-22 PROCEDURE — 71045 X-RAY EXAM CHEST 1 VIEW: CPT | Mod: 26

## 2023-09-22 PROCEDURE — 99156 MOD SED OTH PHYS/QHP 5/>YRS: CPT

## 2023-09-22 PROCEDURE — 71045 X-RAY EXAM CHEST 1 VIEW: CPT

## 2023-09-22 PROCEDURE — 36415 COLL VENOUS BLD VENIPUNCTURE: CPT

## 2023-09-22 PROCEDURE — 85730 THROMBOPLASTIN TIME PARTIAL: CPT

## 2023-09-22 PROCEDURE — 84443 ASSAY THYROID STIM HORMONE: CPT

## 2023-09-22 PROCEDURE — 96372 THER/PROPH/DIAG INJ SC/IM: CPT | Mod: XU

## 2023-09-22 PROCEDURE — 83735 ASSAY OF MAGNESIUM: CPT

## 2023-09-22 PROCEDURE — 80053 COMPREHEN METABOLIC PANEL: CPT

## 2023-09-22 PROCEDURE — 99215 OFFICE O/P EST HI 40 MIN: CPT | Mod: 25

## 2023-09-22 RX ORDER — DILTIAZEM HCL 120 MG
15 CAPSULE, EXT RELEASE 24 HR ORAL ONCE
Refills: 0 | Status: COMPLETED | OUTPATIENT
Start: 2023-09-22 | End: 2023-09-22

## 2023-09-22 RX ORDER — IBUTILIDE FUMARATE 0.1 MG/ML
1 INJECTION, SOLUTION INTRAVENOUS ONCE
Refills: 0 | Status: COMPLETED | OUTPATIENT
Start: 2023-09-22 | End: 2023-09-22

## 2023-09-22 RX ORDER — APIXABAN 2.5 MG/1
1 TABLET, FILM COATED ORAL
Qty: 60 | Refills: 0
Start: 2023-09-22 | End: 2023-10-21

## 2023-09-22 RX ORDER — ENOXAPARIN SODIUM 100 MG/ML
90 INJECTION SUBCUTANEOUS ONCE
Refills: 0 | Status: COMPLETED | OUTPATIENT
Start: 2023-09-22 | End: 2023-09-22

## 2023-09-22 RX ADMIN — Medication 15 MILLIGRAM(S): at 10:19

## 2023-09-22 RX ADMIN — IBUTILIDE FUMARATE 360 MILLIGRAM(S): 0.1 INJECTION, SOLUTION INTRAVENOUS at 11:32

## 2023-09-22 RX ADMIN — ENOXAPARIN SODIUM 90 MILLIGRAM(S): 100 INJECTION SUBCUTANEOUS at 11:32

## 2023-09-22 NOTE — ED ADULT NURSE REASSESSMENT NOTE - NS ED NURSE REASSESS COMMENT FT1
Pt sitting up in stretcher cardio noted at bedside pt notes to be on bedside cardiac monitor. afib noted. pt placed on portable monitor no distress ppt notes no worsening symptoms, will continue to monitor pt.  Fidelina VALDEZ

## 2023-09-22 NOTE — ED PROVIDER NOTE - NSFOLLOWUPINSTRUCTIONS_ED_ALL_ED_FT
Atrial Fibrillation    Atrial fibrillation is a type of irregular or rapid heartbeat (arrhythmia). In atrial fibrillation, the top part of the heart (atria) beats in an irregular pattern. This makes the heart unable to pump blood normally and effectively.    The goal of treatment is to prevent blood clots from forming, control your heart rate, or restore your heartbeat to a normal rhythm. If this condition is not treated, it can cause serious problems, such as a weakened heart muscle (cardiomyopathy) or a stroke.    What are the causes?  This condition is often caused by medical conditions that damage the heart's electrical system. These include:  High blood pressure (hypertension). This is the most common cause.  Certain heart problems or conditions, such as heart failure, coronary artery disease, heart valve problems, or heart surgery.  Diabetes.  Overactive thyroid (hyperthyroidism).  Obesity.  Chronic kidney disease.  In some cases, the cause of this condition is not known.    What increases the risk?  This condition is more likely to develop in:  Older people.  People who smoke.  Athletes who do endurance exercise.  People who have a family history of atrial fibrillation.  Men.  People who use drugs.  People who drink a lot of alcohol.  People who have lung conditions, such as emphysema, pneumonia, or COPD.  People who have obstructive sleep apnea.  What are the signs or symptoms?  Symptoms of this condition include:  A feeling that your heart is racing or beating irregularly.  Discomfort or pain in your chest.  Shortness of breath.  Sudden light-headedness or weakness.  Tiring easily during exercise or activity.  Fatigue.  Syncope (fainting).  Sweating.  In some cases, there are no symptoms.    How is this diagnosed?  Your health care provider may detect atrial fibrillation when taking your pulse. If detected, this condition may be diagnosed with:  An electrocardiogram (ECG) to check electrical signals of the heart.  An ambulatory cardiac monitor to record your heart's activity for a few days.  A transthoracic echocardiogram (TTE) to create pictures of your heart.  A transesophageal echocardiogram (KARYN) to create even closer pictures of your heart.  A stress test to check your blood supply while you exercise.  Imaging tests, such as a CT scan or chest X-ray.  Blood tests.  How is this treated?  Treatment depends on underlying conditions and how you feel when you experience atrial fibrillation. This condition may be treated with:  Medicines to prevent blood clots or to treat heart rate or heart rhythm problems.  Electrical cardioversion to reset the heart's rhythm.  A pacemaker to correct abnormal heart rhythm.  Ablation to remove the heart tissue that sends abnormal signals.  Left atrial appendage closure to seal the area where blood clots can form.  In some cases, underlying conditions will be treated.    Follow these instructions at home:  Medicines    Take over-the counter and prescription medicines only as told by your health care provider.  Do not take any new medicines without talking to your health care provider.  If you are taking blood thinners:  Talk with your health care provider before you take any medicines that contain aspirin or NSAIDs, such as ibuprofen. These medicines increase your risk for dangerous bleeding.  Take your medicine exactly as told, at the same time every day.  Avoid activities that could cause injury or bruising, and follow instructions about how to prevent falls.  Wear a medical alert bracelet or carry a card that lists what medicines you take.  Lifestyle        Do not use any products that contain nicotine or tobacco, such as cigarettes, e-cigarettes, and chewing tobacco. If you need help quitting, ask your health care provider.  Eat heart-healthy foods. Talk with a dietitian to make an eating plan that is right for you.  Exercise regularly as told by your health care provider.  Do not drink alcohol.  Lose weight if you are overweight.  Do not use drugs, including cannabis.  General instructions    If you have obstructive sleep apnea, manage your condition as told by your health care provider.  Do not use diet pills unless your health care provider approves. Diet pills can make heart problems worse.  Keep all follow-up visits as told by your health care provider. This is important.  Contact a health care provider if you:  Notice a change in the rate, rhythm, or strength of your heartbeat.  Are taking a blood thinner and you notice more bruising.  Tire more easily when you exercise or do heavy work.  Have a sudden change in weight.  Get help right away if you have:    Chest pain, abdominal pain, sweating, or weakness.  Trouble breathing.  Side effects of blood thinners, such as blood in your vomit, stool, or urine, or bleeding that cannot stop.  Any symptoms of a stroke. "BE FAST" is an easy way to remember the main warning signs of a stroke:  B - Balance. Signs are dizziness, sudden trouble walking, or loss of balance.  E - Eyes. Signs are trouble seeing or a sudden change in vision.  F - Face. Signs are sudden weakness or numbness of the face, or the face or eyelid drooping on one side.  A - Arms. Signs are weakness or numbness in an arm. This happens suddenly and usually on one side of the body.  S - Speech. Signs are sudden trouble speaking, slurred speech, or trouble understanding what people say.  T - Time. Time to call emergency services. Write down what time symptoms started.  Other signs of a stroke, such as:  A sudden, severe headache with no known cause.  Nausea or vomiting.  Seizure.  These symptoms may represent a serious problem that is an emergency. Do not wait to see if the symptoms will go away. Get medical help right away. Call your local emergency services (911 in the [place] Do not drive yourself to the hospital.    Summary  Atrial fibrillation is a type of irregular or rapid heartbeat (arrhythmia).  Symptoms include a feeling that your heart is beating fast or irregularly.  You may be given medicines to prevent blood clots or to treat heart rate or heart rhythm problems.  Get help right away if you have signs or symptoms of a stroke.  Get help right away if you cannot catch your breath or have chest pain or pressure.  This information is not intended to replace advice given to you by your health care provider. Make sure you discuss any questions you have with your health care provider.

## 2023-09-22 NOTE — CONSULT NOTE ADULT - SUBJECTIVE AND OBJECTIVE BOX
[facility] - Shakira Peña Pannella, Patel, Savella, Cohen Goodger  Office Number: [phone]    Initial Consult Note    CHIEF COMPLAINT: Patient is a 58y old  Male who presents with a chief complaint of     HPI: This is a 57 y/o with HLD and PAfib c/o palpitations last night that persisted this morning.  Found to be in rapid Afib in Dr. Trevizo's office and found to be in rapid Afib.  Of note, he was here in 3/2019 for rapid Afib, Ibulitide x 2 unsuccesful requiring DCCV.  Had a TTE in 4/2019 that was normal    PAST MEDICAL & SURGICAL HISTORY:  Afib  Carcinoid tumor  s/p resection in 2008  SOCIAL HISTORY:  No tobacco, ethanol, or drug abuse.  FAMILY HISTORY:  FHx: myocardial infarction  Father- 43    No family history of acute MI or sudden cardiac death.  MEDICATIONS  (STANDING):  enoxaparin Injectable 90 milliGRAM(s) SubCutaneous Once    MEDICATIONS  (PRN):    Allergies    Motrin (Other)  PC Pen VK (Unknown)    Intolerances    REVIEW OF SYSTEMS:  CONSTITUTIONAL: No weakness, fevers or chills  EYES/ENT: No visual changes;  No vertigo or throat pain   NECK: No pain or stiffness  RESPIRATORY: No cough, wheezing, hemoptysis; No shortness of breath  CARDIOVASCULAR: No chest pain or palpitations  GASTROINTESTINAL: No abdominal pain. No nausea, vomiting, or hematemesis; No diarrhea or constipation. No melena or hematochezia.  GENITOURINARY: No dysuria, frequency or hematuria  NEUROLOGICAL: No numbness or weakness  SKIN: No itching or rash  All other review of systems is negative unless indicated above  VITAL SIGNS:   Vital Signs Last 24 Hrs  T(C): 36.7 (22 Sep 2023 09:34), Max: 36.7 (22 Sep 2023 09:34)  T(F): 98 (22 Sep 2023 09:34), Max: 98 (22 Sep 2023 09:34)  HR: 74 (22 Sep 2023 09:34) (74 - 74)  BP: 128/68 (22 Sep 2023 09:34) (128/68 - 128/68)  BP(mean): --  RR: 18 (22 Sep 2023 09:34) (18 - 18)  SpO2: 99% (22 Sep 2023 09:34) (99% - 99%)    Parameters below as of 22 Sep 2023 09:34  Patient On (Oxygen Delivery Method): room air    I&O's Summary    On Exam:  Constitutional: NAD, alert and oriented x 3  Lungs:  Non-labored, breath sounds are clear bilaterally, No wheezing, rales or rhonchi  Cardiovascular: RRR.  S1 and S2 positive.  No murmurs, rubs, gallops or clicks  Gastrointestinal: Bowel Sounds present, soft, nontender.   Lymph: No peripheral edema. No cervical lymphadenopathy.  Neurological: Alert, no focal deficits  Skin: No rashes or ulcers   Psych:  Mood & affect appropriate.    LABS: All Labs Reviewed:                        16.2   5.81  )-----------( 230      ( 22 Sep 2023 10:00 )             46.3   PT/INR - ( 22 Sep 2023 10:00 )   PT: 10.2 sec;   INR: 0.87 ratio    PTT - ( 22 Sep 2023 10:00 )  PTT:33.3 sec    RADIOLOGY:    EKG:    Assessment/Plan:  58y Male    Odalys Salazarstad DNP, NP-C, AGACNP-C  Cardiology  Call TEAMS       [facility] - Shakira Peña Pannella, Patel, Savella, Cohen Siddharthaclifton  Office Number: [phone]    Initial Consult Note    CHIEF COMPLAINT: Patient is a 58y old  Male who presents with a chief complaint of     HPI: This is a 57 y/o with HLD and PAfib c/o palpitations last night that persisted this morning.  Found to be in rapid Afib in Dr. Trevizo's office and found to be in rapid Afib.  Patient denies lightheadedness, dizziness, syncope, fever, chills, N/V, diarrhea, bleeding, sick contact or recent long distance travel.  Though, adimts to have taken the train to [place] recently on a 3-hour ride.  Denies SOB, BAHENA, pleuritic pain or orthopnea.  Denies leg pain or edema.  Today, he woke up with palpitations and dizziness.  Of note, he was here in 3/2019 for rapid Afib, Ibutilide x 2 unsuccessful requiring DCCV.  Was placed on Eliquis and BB x 1 month thereafter but subsequently discontinued.  Had a TTE in 4/2019 that was normal    PAST MEDICAL & SURGICAL HISTORY:  Afib  Carcinoid tumor  s/p resection in 2008  SOCIAL HISTORY:  No tobacco, ethanol, or drug abuse.  FAMILY HISTORY:  FHx: myocardial infarction  Father- 43    No family history of acute MI or sudden cardiac death.  MEDICATIONS  (STANDING):  enoxaparin Injectable 90 milliGRAM(s) SubCutaneous Once    MEDICATIONS  (PRN):    Allergies    Motrin (Other)  PC Pen VK (Unknown)    Intolerances    REVIEW OF SYSTEMS:  CONSTITUTIONAL: No weakness, fevers or chills  EYES/ENT: No visual changes;  No vertigo or throat pain   NECK: No pain or stiffness  RESPIRATORY: No cough, wheezing, hemoptysis; No shortness of breath  CARDIOVASCULAR: No chest pain or palpitations  GASTROINTESTINAL: No abdominal pain. No nausea, vomiting, or hematemesis; No diarrhea or constipation. No melena or hematochezia.  GENITOURINARY: No dysuria, frequency or hematuria  NEUROLOGICAL: No numbness or weakness  SKIN: No itching or rash  All other review of systems is negative unless indicated above  VITAL SIGNS:   Vital Signs Last 24 Hrs  T(C): 36.7 (22 Sep 2023 09:34), Max: 36.7 (22 Sep 2023 09:34)  T(F): 98 (22 Sep 2023 09:34), Max: 98 (22 Sep 2023 09:34)  HR: 74 (22 Sep 2023 09:34) (74 - 74)  BP: 128/68 (22 Sep 2023 09:34) (128/68 - 128/68)  BP(mean): --  RR: 18 (22 Sep 2023 09:34) (18 - 18)  SpO2: 99% (22 Sep 2023 09:34) (99% - 99%)    Parameters below as of 22 Sep 2023 09:34  Patient On (Oxygen Delivery Method): room air    I&O's Summary    On Exam:  Constitutional: NAD, alert and oriented x 3  Lungs:  Non-labored, breath sounds are clear bilaterally, No wheezing, rales or rhonchi  Cardiovascular: RRR.  S1 and S2 positive.  No murmurs, rubs, gallops or clicks  Gastrointestinal: Bowel Sounds present, soft, nontender.   Lymph: No peripheral edema. No cervical lymphadenopathy.  Neurological: Alert, no focal deficits  Skin: No rashes or ulcers   Psych:  Mood & affect appropriate.    LABS: All Labs Reviewed:                        16.2   5.81  )-----------( 230      ( 22 Sep 2023 10:00 )             46.3   PT/INR - ( 22 Sep 2023 10:00 )   PT: 10.2 sec;   INR: 0.87 ratio    PTT - ( 22 Sep 2023 10:00 )  PTT:33.3 sec    RADIOLOGY:    EKG:  Afib with RVR

## 2023-09-22 NOTE — ED ADULT NURSE REASSESSMENT NOTE - NS ED NURSE REASSESS COMMENT FT1
1535:  TASK RN:   the patient is d/c.  iv was previously removed.  the patient is sitting upright, feeling good.  he is aware that he is to f/u with Dr. Sarah, and will set up appt.  if he experiences any return symptoms, or new symptoms, he is to return to the er immediately.  the patient verbalized understanding of all instructions, and he ambulated to the waiting room with all of his belongings.   wife is waiting outside with car.  zoe granger.

## 2023-09-22 NOTE — ED ADULT NURSE NOTE - OBJECTIVE STATEMENT
58yr old male a&ox4 arrives to ED wife noted at bedside c/o being seen at providers and instructed to come into ED per pt noted to be in a.rib. Pt notes feeling flutters but notes no actual pain. pt denies fever chills, chest pain or sob at this time. Fidelina VALDEZ

## 2023-09-22 NOTE — CONSULT NOTE ADULT - NS ATTEND AMEND GEN_ALL_CORE FT
Pt in AF and symtpomatic. Given Ibutilide with no effect. given lovenox fd. plan for dccv.     addendum  DCCV at 200J x 1 with anesthesia. Return to SR. was janell in 50s. no complicatation. take eliquis 5mg q12 x 1 month. no bb 2/2 sb. fu with dr gong.

## 2023-09-22 NOTE — ED PROVIDER NOTE - PROGRESS NOTE DETAILS
Patient seen by cardiology, if ibutilide does not work, will likely do cardioversion.  Heart rate improving here.  Still in atrial fibrillation. Arias Holt MD. Patient underwent cardioversion, now in sinus bradycardia.  After discussion with cardiology, will recommend Eliquis twice daily, follow-up in his office.  No rate control agent at this time given his sinus bradycardia.  We will plan for discharge pending repeat vital signs.  Arias Holt MD. Patient now awake and alert, at baseline mental status, feeling well, will discharge.  Arias Holt MD.

## 2023-09-22 NOTE — ED PROVIDER NOTE - PHYSICAL EXAMINATION
Constitutional: Awake, Alert, non-toxic. No acute distress.  HEAD: Normocephalic, atraumatic.   EYES: PERRL, EOM intact, conjunctiva and sclera are clear bilaterally.  ENT: External ears normal. No rhinorrhea, no tracheal deviation   NECK: Supple, non-tender  CARDIOVASCULAR: tachycardic with irregular rate and rhythm.  RESPIRATORY: Normal respiratory effort; breath sounds CTAB, no wheezes, rhonchi, or rales. Speaking in full sentences. No accessory muscle use.   ABDOMEN: Soft; non-tender, non-distended. No rebound or guarding.   MSK:  no lower extremity edema, no deformities, no calf pain  SKIN: Warm, dry  NEURO: A&O x3. Sensory and motor functions are grossly intact. Speech is normal. No facial droop.  PSYCH: Appearance and judgement seem appropriate for gender and age.

## 2023-09-22 NOTE — ED PROVIDER NOTE - PATIENT PORTAL LINK FT
You can access the FollowMyHealth Patient Portal offered by [facility] by registering at the following website: [email or web address]. By joining [facility]’s FollowMyHealth portal, you will also be able to view your health information using other applications (apps) compatible with our system.

## 2023-09-22 NOTE — ED PROVIDER NOTE - CARE PROVIDER_API CALL
Han Trevizo  Cardiology  [address] [place] [address]  Phone: [phone]  Fax: [phone]  Follow Up Time: 7-10 Days

## 2023-09-22 NOTE — ED ADULT NURSE NOTE - CHIEF COMPLAINT QUOTE
" palpitations, sent by PMD,  A Fib, no sob, denies C Pain "
Abdomen soft, non distended, non-tender, no rebound, no guarding.

## 2023-09-22 NOTE — ED PROVIDER NOTE - CLINICAL SUMMARY MEDICAL DECISION MAKING FREE TEXT BOX
Patient here in rapid A-fib.  No chest pain or shortness of breath to suggest ACS or PE as inciting event.  Likely known to his paroxysmal A-fib.  Will check electrolytes.  We will also evaluate thyroid function testing.  Patient may be candidate for cardioversion.  At this time will attempt rate control with Cardizem.  Cardiology to be consulted as well.  Patient may need anticoagulation pending discussion with cardiology.

## 2023-09-22 NOTE — ED PROVIDER NOTE - OBJECTIVE STATEMENT
Patient with a past medical history paroxysmal A-fib not on any anticoagulation who was cardioverted a few years ago, history of hyperlipidemia is presenting with palpitations.  Yesterday evening while resting he felt his heart start racing.  States persisted through this morning.  Saw his cardiologist and was found to be in rapid A-fib, so sent to ED for evaluation.  Patient denies any chest pain, shortness of breath.  No nausea or vomiting.  States he feels a little lightheaded with the palpitations.

## 2023-09-22 NOTE — CONSULT NOTE ADULT - ASSESSMENT
Assessment/Plan: This is a 59 y/o with HLD and PAfib c/o palpitations last night that persisted this morning.  Found to be in rapid Afib in Dr. Trevizo's office and found to be in rapid Afib.     Paroxysmal Afib  - Here for palpitations, found to be in rapid Afib at 140's.  Was sent in by Dr. Trevizo  - Was diagnosed in 3/2019 s/p Ibulitide x 2 which was unsuccessful, requiring DCCV  - s/p Cardizem 15 mg IV x 1 in ED.  HR slowed down to 60-80 but remains in Afib  - Would attempt to chemically convert him with Ibutilide.  If unsuccessful, would do DCCV  - Maintain NPO status  - TSH and electrolytes normal.  Had a normal TTE in 2019  - Would D/C patient on BB and f/u with Dr. Trevizo  - If admitted, we will follow    Odalys Mora DNP, NP-C, AGACNP-C  Cardiology  Call TEAMS

## 2023-10-11 ENCOUNTER — APPOINTMENT (OUTPATIENT)
Dept: CARDIOLOGY | Facility: CLINIC | Age: 58
End: 2023-10-11

## 2023-10-12 ENCOUNTER — NON-APPOINTMENT (OUTPATIENT)
Age: 58
End: 2023-10-12

## 2023-10-12 ENCOUNTER — APPOINTMENT (OUTPATIENT)
Dept: CARDIOLOGY | Facility: CLINIC | Age: 58
End: 2023-10-12
Payer: COMMERCIAL

## 2023-10-12 VITALS
HEIGHT: 77 IN | DIASTOLIC BLOOD PRESSURE: 70 MMHG | SYSTOLIC BLOOD PRESSURE: 108 MMHG | HEART RATE: 47 BPM | OXYGEN SATURATION: 97 % | WEIGHT: 195 LBS | BODY MASS INDEX: 23.02 KG/M2

## 2023-10-12 PROCEDURE — 93000 ELECTROCARDIOGRAM COMPLETE: CPT

## 2023-10-12 PROCEDURE — 99215 OFFICE O/P EST HI 40 MIN: CPT

## 2023-10-12 RX ORDER — ATORVASTATIN CALCIUM 20 MG/1
20 TABLET, FILM COATED ORAL
Qty: 30 | Refills: 1 | Status: ACTIVE | COMMUNITY
Start: 2019-03-13

## 2023-10-19 ENCOUNTER — APPOINTMENT (OUTPATIENT)
Dept: CARDIOLOGY | Facility: CLINIC | Age: 58
End: 2023-10-19
Payer: COMMERCIAL

## 2023-10-19 PROCEDURE — 93306 TTE W/DOPPLER COMPLETE: CPT

## 2024-01-25 ENCOUNTER — APPOINTMENT (OUTPATIENT)
Dept: CARDIOLOGY | Facility: CLINIC | Age: 59
End: 2024-01-25
Payer: COMMERCIAL

## 2024-01-25 ENCOUNTER — NON-APPOINTMENT (OUTPATIENT)
Age: 59
End: 2024-01-25

## 2024-01-25 VITALS
HEIGHT: 77 IN | DIASTOLIC BLOOD PRESSURE: 78 MMHG | SYSTOLIC BLOOD PRESSURE: 116 MMHG | OXYGEN SATURATION: 99 % | BODY MASS INDEX: 23.85 KG/M2 | WEIGHT: 202 LBS | HEART RATE: 55 BPM

## 2024-01-25 PROCEDURE — 93000 ELECTROCARDIOGRAM COMPLETE: CPT

## 2024-01-25 PROCEDURE — G2211 COMPLEX E/M VISIT ADD ON: CPT

## 2024-01-25 PROCEDURE — 99214 OFFICE O/P EST MOD 30 MIN: CPT

## 2024-05-15 ENCOUNTER — APPOINTMENT (OUTPATIENT)
Dept: CARDIOLOGY | Facility: CLINIC | Age: 59
End: 2024-05-15
Payer: COMMERCIAL

## 2024-05-15 ENCOUNTER — NON-APPOINTMENT (OUTPATIENT)
Age: 59
End: 2024-05-15

## 2024-05-15 VITALS
HEIGHT: 77 IN | SYSTOLIC BLOOD PRESSURE: 106 MMHG | WEIGHT: 202 LBS | HEART RATE: 57 BPM | OXYGEN SATURATION: 98 % | BODY MASS INDEX: 23.85 KG/M2 | DIASTOLIC BLOOD PRESSURE: 61 MMHG

## 2024-05-15 DIAGNOSIS — I48.0 PAROXYSMAL ATRIAL FIBRILLATION: ICD-10-CM

## 2024-05-15 DIAGNOSIS — E78.5 HYPERLIPIDEMIA, UNSPECIFIED: ICD-10-CM

## 2024-05-15 PROCEDURE — 99214 OFFICE O/P EST MOD 30 MIN: CPT

## 2024-05-15 PROCEDURE — G2211 COMPLEX E/M VISIT ADD ON: CPT | Mod: NC,1L

## 2024-05-15 PROCEDURE — 93000 ELECTROCARDIOGRAM COMPLETE: CPT

## 2024-05-15 NOTE — HISTORY OF PRESENT ILLNESS
[FreeTextEntry1] : This is a 58 year old man with a history of PAF who presents to the office for follow up.  In March of 2019 he went into atrial fibrillation.  He presented to , and got ibutilide x 2, which did not convert him.  He ended up undergoing DCCV.  He was started on Eliquis and metoprolol.   He was on this for one month, and then stopped both drugs.    In September of 2023 he was seen in the  ED again with AF with RVR.  We attempted chemical cardioversion with ibutilide without conversion.  Pt  given LOVENOX and now s/p DCCV with conversion to sinus rhythm. He was subsequently discharged on Eliquis 5 BID, and has since stopped it.    He has been feeling well thus far.  No symptoms consistent with atrial fibrillation. He remains off eliquis. His stress levels have decreased since last visit.  He has been exercising much more without concerning symptoms He denies symptoms of recurrence.  He denies chest pain, dyspnea, PND, orthopnea, LE swelling, dizziness, or syncope.

## 2024-05-15 NOTE — DISCUSSION/SUMMARY
[EKG obtained to assist in diagnosis and management of assessed problem(s)] : EKG obtained to assist in diagnosis and management of assessed problem(s) [FreeTextEntry1] : This is a 58 year old man with a history of PAF who presents to the office for follow up.  In March of 2019 he went into atrial fibrillation.  He presented to [facility], and got ibutilide x 2, which did not convert him.  He ended up undergoing DCCV.  He was started on Eliquis and metoprolol, which he continued for one month. Now s/p hospitalization for recurrence of Afib with RVR. S/P DCCV in September of 2023.   He has not had any recurrent AF.  He arrives today in no acute distress.  He feels well. Has been able to get back into exercising with out distress of recurrence of symptoms.  He is in sinus today, sinus bradycardia. His blood pressure is normal.  Mr Ureña likely had recurrence of PAF in setting of increase stress.   He can remain off Eliquis. Echocardiogram from 10/2023 demonstrates normal function.  He will continue lipid management for carotid atherosclerosis to goal LDL <100, ideally <70.  He will have blood work done at his annual physical.  We will continue to monitor for recurrence fof PAF for now.  He can follow in 6 months, sooner as needed.

## 2024-05-15 NOTE — REASON FOR VISIT
[Arrhythmia/ECG Abnorrmalities] : arrhythmia/ECG abnormalities [Hyperlipidemia] : hyperlipidemia [Follow-Up - Clinic] : a clinic follow-up of [Atrial Fibrillation] : atrial fibrillation

## 2024-05-15 NOTE — PHYSICAL EXAM
[Normal Appearance] : normal appearance [General Appearance - In No Acute Distress] : no acute distress [Normal Oral Mucosa] : normal oral mucosa [Normal Jugular Venous V Waves Present] : normal jugular venous V waves present [Respiration, Rhythm And Depth] : normal respiratory rhythm and effort [Exaggerated Use Of Accessory Muscles For Inspiration] : no accessory muscle use [Auscultation Breath Sounds / Voice Sounds] : lungs were clear to auscultation bilaterally [Bowel Sounds] : normal bowel sounds [Abdomen Soft] : soft [Abdomen Tenderness] : non-tender [Abnormal Walk] : normal gait [Gait - Sufficient For Exercise Testing] : the gait was sufficient for exercise testing [Nail Clubbing] : no clubbing of the fingernails [Cyanosis, Localized] : no localized cyanosis [Petechial Hemorrhages (___cm)] : no petechial hemorrhages [Skin Color & Pigmentation] : normal skin color and pigmentation [] : no rash [No Venous Stasis] : no venous stasis [Skin Lesions] : no skin lesions [Oriented To Time, Place, And Person] : oriented to person, place, and time [Affect] : the affect was normal [Mood] : the mood was normal [No Anxiety] : not feeling anxious [Not Palpable] : not palpable [No Precordial Heave] : no precordial heave was noted [Bradycardia] : bradycardic [1+] : left 1+ [Well Developed] : well developed [Well Nourished] : well nourished [No Acute Distress] : no acute distress [Normal Conjunctiva] : normal conjunctiva [Normal Venous Pressure] : normal venous pressure [No Carotid Bruit] : no carotid bruit [Normal S1, S2] : normal S1, S2 [No Rub] : no rub [No Gallop] : no gallop [Rhythm Regular] : regular [Normal S1] : normal S1 [Normal S2] : normal S2 [No Murmur] : no murmurs heard [No Pitting Edema] : no pitting edema present [No Abnormalities] : the abdominal aorta was not enlarged and no bruit was heard [Clear Lung Fields] : clear lung fields [Good Air Entry] : good air entry [No Respiratory Distress] : no respiratory distress  [Soft] : abdomen soft [Non Tender] : non-tender [No Masses/organomegaly] : no masses/organomegaly [Normal Bowel Sounds] : normal bowel sounds [Normal Gait] : normal gait [No Edema] : no edema [No Cyanosis] : no cyanosis [No Clubbing] : no clubbing [No Varicosities] : no varicosities [No Rash] : no rash [No Skin Lesions] : no skin lesions [Moves all extremities] : moves all extremities [No Focal Deficits] : no focal deficits [Normal Speech] : normal speech [Alert and Oriented] : alert and oriented [Normal memory] : normal memory [FreeTextEntry1] : No JVD [Right Carotid Bruit] : no bruit heard over the right carotid [Left Carotid Bruit] : no bruit heard over the left carotid

## 2024-05-15 NOTE — CARDIOLOGY SUMMARY
[___] : [unfilled] [de-identified] : Sinus bradycardia.  [de-identified] : 10/2023 LVEF 63% trace MR [de-identified] : 2021 mild <40%

## 2024-05-19 NOTE — HISTORY OF PRESENT ILLNESS
[FreeTextEntry1] : Patient reports that this past Tuesday while at home during work he suddenly began to feel room spinning while on the computer. He was also feeling nausea but no vomiting and it was progressively getting worse. It took several hours to resolve.\par It appeared to get worse upon standing and improved with laying down. He denies any ear pain or visual changes  and no headaches and no numbness or tingling in his arms or legs. \par No recent infection or fevers reported.\par \par He went to see an ENT yesterday as his symptoms were not improved and he had a hearing test and will go back for vestibular testing. \par \par He is feeling much better today and has no symptoms.  No

## 2024-07-29 ENCOUNTER — APPOINTMENT (OUTPATIENT)
Dept: ORTHOPEDIC SURGERY | Facility: CLINIC | Age: 59
End: 2024-07-29
Payer: COMMERCIAL

## 2024-07-29 ENCOUNTER — NON-APPOINTMENT (OUTPATIENT)
Age: 59
End: 2024-07-29

## 2024-07-29 VITALS — BODY MASS INDEX: 23.85 KG/M2 | WEIGHT: 202 LBS | HEIGHT: 77 IN

## 2024-07-29 DIAGNOSIS — M54.2 CERVICALGIA: ICD-10-CM

## 2024-07-29 DIAGNOSIS — M47.812 SPONDYLOSIS W/OUT MYELOPATHY OR RADICULOPATHY, CERVICAL REGION: ICD-10-CM

## 2024-07-29 PROCEDURE — 99213 OFFICE O/P EST LOW 20 MIN: CPT

## 2024-07-29 RX ORDER — METHYLPREDNISOLONE 4 MG/1
4 TABLET ORAL
Qty: 21 | Refills: 0 | Status: ACTIVE | COMMUNITY
Start: 2024-07-29 | End: 1900-01-01

## 2024-07-31 PROBLEM — M47.812 CERVICAL SPONDYLOSIS: Status: ACTIVE | Noted: 2024-07-31

## 2024-07-31 NOTE — HISTORY OF PRESENT ILLNESS
[de-identified] : I saw this 59-year-old male 3-1/2 years ago for cervical spine symptoms.  He had no radicular symptoms at that point and a normal neurologic exam.  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  X-rays revealed multilevel mild to moderate degenerative changes in the cervical spine.  He was placed on a Medrol Dosepak and the symptoms resolved without need for follow-up.  We had a long discussion about cervical spine activities he needed to avoid which may well aggravate or propagate the symptoms.  He recently has started with another episode of neck and right-sided upper back pain.  He has not had arm pain or neurologic symptoms.  There have been no changes in his gait or balance and there are no problems with hand function.  He relates that last year he had his third episode of atrial fibrillation again successfully cardioverted and he was on anticoagulation therapy for only 1 month.  He has not had further GI bleeding but had a scan that revealed a suspicious area for the carcinoid that was biopsied and negative.

## 2024-07-31 NOTE — DISCUSSION/SUMMARY
[Medication Risks Reviewed] : Medication risks reviewed [de-identified] : We again discussed activities to avoid which may aggravate his symptoms.  He will rest and use moist heat.  He is again been started on Medrol Dosepak.  He will call if there are problems with the medication or worsening of his symptoms.  He will call in a week if he is no better and we could repeat the Medrol Dosepak.  I will see him for follow-up otherwise on a as needed basis.

## 2024-11-08 ENCOUNTER — APPOINTMENT (OUTPATIENT)
Dept: CARDIOLOGY | Facility: CLINIC | Age: 59
End: 2024-11-08
Payer: COMMERCIAL

## 2024-11-08 ENCOUNTER — NON-APPOINTMENT (OUTPATIENT)
Age: 59
End: 2024-11-08

## 2024-11-08 VITALS
WEIGHT: 202 LBS | OXYGEN SATURATION: 100 % | BODY MASS INDEX: 23.85 KG/M2 | SYSTOLIC BLOOD PRESSURE: 116 MMHG | HEART RATE: 54 BPM | HEIGHT: 77 IN | DIASTOLIC BLOOD PRESSURE: 75 MMHG

## 2024-11-08 DIAGNOSIS — I48.0 PAROXYSMAL ATRIAL FIBRILLATION: ICD-10-CM

## 2024-11-08 DIAGNOSIS — E78.5 HYPERLIPIDEMIA, UNSPECIFIED: ICD-10-CM

## 2024-11-08 PROCEDURE — 99214 OFFICE O/P EST MOD 30 MIN: CPT

## 2024-11-08 PROCEDURE — G2211 COMPLEX E/M VISIT ADD ON: CPT | Mod: NC

## 2024-11-08 PROCEDURE — 93000 ELECTROCARDIOGRAM COMPLETE: CPT

## 2024-12-30 ENCOUNTER — APPOINTMENT (OUTPATIENT)
Dept: OTOLARYNGOLOGY | Facility: CLINIC | Age: 59
End: 2024-12-30
Payer: COMMERCIAL

## 2024-12-30 VITALS
BODY MASS INDEX: 23.62 KG/M2 | DIASTOLIC BLOOD PRESSURE: 71 MMHG | HEART RATE: 64 BPM | SYSTOLIC BLOOD PRESSURE: 107 MMHG | WEIGHT: 200 LBS | HEIGHT: 77 IN

## 2024-12-30 DIAGNOSIS — H93.293 OTHER ABNORMAL AUDITORY PERCEPTIONS, BILATERAL: ICD-10-CM

## 2024-12-30 DIAGNOSIS — H90.3 SENSORINEURAL HEARING LOSS, BILATERAL: ICD-10-CM

## 2024-12-30 PROCEDURE — 92567 TYMPANOMETRY: CPT

## 2024-12-30 PROCEDURE — 92557 COMPREHENSIVE HEARING TEST: CPT

## 2024-12-30 PROCEDURE — 99203 OFFICE O/P NEW LOW 30 MIN: CPT

## 2025-02-20 NOTE — PROCEDURE NOTE - NSTIMEOUT_GEN_A_CORE
Patient's first and last name, , procedure, and correct site confirmed prior to the start of procedure.
[id number]

## 2025-05-03 ENCOUNTER — NON-APPOINTMENT (OUTPATIENT)
Age: 60
End: 2025-05-03

## 2025-05-05 ENCOUNTER — APPOINTMENT (OUTPATIENT)
Dept: CARDIOLOGY | Facility: CLINIC | Age: 60
End: 2025-05-05
Payer: COMMERCIAL

## 2025-05-05 ENCOUNTER — NON-APPOINTMENT (OUTPATIENT)
Age: 60
End: 2025-05-05

## 2025-05-05 VITALS
OXYGEN SATURATION: 96 % | SYSTOLIC BLOOD PRESSURE: 126 MMHG | WEIGHT: 197 LBS | BODY MASS INDEX: 23.26 KG/M2 | HEART RATE: 65 BPM | HEIGHT: 77 IN | DIASTOLIC BLOOD PRESSURE: 76 MMHG

## 2025-05-05 DIAGNOSIS — E78.5 HYPERLIPIDEMIA, UNSPECIFIED: ICD-10-CM

## 2025-05-05 PROCEDURE — G2211 COMPLEX E/M VISIT ADD ON: CPT | Mod: NC

## 2025-05-05 PROCEDURE — 99214 OFFICE O/P EST MOD 30 MIN: CPT

## 2025-05-05 PROCEDURE — 93000 ELECTROCARDIOGRAM COMPLETE: CPT
